# Patient Record
Sex: MALE | Race: WHITE | NOT HISPANIC OR LATINO | Employment: FULL TIME | URBAN - METROPOLITAN AREA
[De-identification: names, ages, dates, MRNs, and addresses within clinical notes are randomized per-mention and may not be internally consistent; named-entity substitution may affect disease eponyms.]

---

## 2018-01-29 ENCOUNTER — OFFICE VISIT (OUTPATIENT)
Dept: NEUROLOGY | Facility: CLINIC | Age: 53
End: 2018-01-29
Payer: COMMERCIAL

## 2018-01-29 ENCOUNTER — OFFICE VISIT (OUTPATIENT)
Dept: URGENT CARE | Facility: CLINIC | Age: 53
End: 2018-01-29
Payer: COMMERCIAL

## 2018-01-29 VITALS
DIASTOLIC BLOOD PRESSURE: 82 MMHG | HEART RATE: 53 BPM | OXYGEN SATURATION: 98 % | WEIGHT: 170 LBS | BODY MASS INDEX: 23.8 KG/M2 | HEIGHT: 71 IN | SYSTOLIC BLOOD PRESSURE: 112 MMHG | TEMPERATURE: 99 F | RESPIRATION RATE: 16 BRPM

## 2018-01-29 VITALS
HEART RATE: 43 BPM | HEIGHT: 71 IN | DIASTOLIC BLOOD PRESSURE: 72 MMHG | WEIGHT: 176.56 LBS | BODY MASS INDEX: 24.72 KG/M2 | SYSTOLIC BLOOD PRESSURE: 125 MMHG

## 2018-01-29 DIAGNOSIS — G43.409 HEMIPLEGIC MIGRAINE WITHOUT STATUS MIGRAINOSUS, NOT INTRACTABLE: Primary | ICD-10-CM

## 2018-01-29 DIAGNOSIS — Z86.69 HISTORY OF MIGRAINE WITH AURA: ICD-10-CM

## 2018-01-29 DIAGNOSIS — R29.898 WEAKNESS OF BOTH LOWER EXTREMITIES: Primary | ICD-10-CM

## 2018-01-29 DIAGNOSIS — G44.89 CHRONIC MIXED HEADACHE SYNDROME: ICD-10-CM

## 2018-01-29 PROCEDURE — 99999 PR PBB SHADOW E&M-EST. PATIENT-LVL III: CPT | Mod: PBBFAC,,, | Performed by: NEUROMUSCULOSKELETAL MEDICINE & OMM

## 2018-01-29 PROCEDURE — 99205 OFFICE O/P NEW HI 60 MIN: CPT | Mod: S$GLB,,, | Performed by: NEUROMUSCULOSKELETAL MEDICINE & OMM

## 2018-01-29 PROCEDURE — 99203 OFFICE O/P NEW LOW 30 MIN: CPT | Mod: S$GLB,,, | Performed by: NURSE PRACTITIONER

## 2018-01-29 RX ORDER — IBUPROFEN 200 MG
200 TABLET ORAL EVERY 6 HOURS PRN
COMMUNITY

## 2018-01-29 RX ORDER — LORATADINE 10 MG/1
TABLET ORAL
COMMUNITY

## 2018-01-29 NOTE — LETTER
January 29, 2018      Regina Mercer, NP  [address] [facility] [address]           [facility]  [address] [facility] [address]  Phone: [phone]          Patient: Andrew Pineda   MR Number: [id number]   YOB: 1965   Date of Visit: 1/29/2018       Dear Regina Mercer:    Thank you for referring Andrew Pineda to me for evaluation. Attached you will find relevant portions of my assessment and plan of care.    If you have questions, please do not hesitate to call me. I look forward to following Andrew Pineda along with you.    Sincerely,    Filiberto Pearson MD    Enclosure  CC:  No Recipients    If you would like to receive this communication electronically, please contact [email or web address] or [phone] to request more information on [employer] Link access.    For providers and/or their staff who would like to refer a patient to [facility], please contact us through our one-stop-shop provider referral line, [facility] Flash, at [phone].    If you feel you have received this communication in error or would no longer like to receive these types of communications, please e-mail [email or web address]

## 2018-01-29 NOTE — PATIENT INSTRUCTIONS
General Referral to [facility]  You were referred to [facility] at our [place] Location for further evaluation.  Address to the clinic is below:  [facility]  [address]  [place], [place]    You will see Dr. Filiberto Pearson    Please return here or go to the Emergency Department for any concerns or worsening of condition.  Please follow up with your primary care doctor or specialist in the next 48-72hrs as needed.    If you  smoke, please stop smoking.  Migraines and Cluster Headaches  Migraines and cluster headaches cause intense, throbbing pain on one side of the head. With a migraine, you may have nausea and vomiting and be sensitive to light and sound. You may also have warning signs, such as flashing lights or loss of parts of your vision, before the pain starts. Migraines are three times more common in women than men. This may be due to hormonal changes during menstruation. Typical migrains may last for 4 to 72 hours untreated.  Cluster headaches recur in groups for days, weeks, or months. The pain is centered around or behind one eye. The eye may also become red or teary, or the eyelid may droop. Migraines and cluster headaches can have many triggers.    Preventing migraines and cluster headaches  Try the following steps:  · Avoid aged cheeses, nuts, beans, chocolate, red wine, or foods that contain caffeine, alcohol, tobacco, nitrates, and MSG.  · Try not to skip meals.  · Dont work in poor lighting.  · Reduce stress as much as you can.  · Get plenty of sleep each night.  · Exercise regularly under your doctors guidance.  · Avoid taking headache medicines for more than 3 days, because of the risk of rebound headaches.  Relieving the pain  Try these suggestions:  · Stay quiet and rest.  · Use cold to numb the pain. Wrap ice or a cold can of soda in a cloth. Hold it against the site of pain for 10 minutes. Repeat every 20 minutes.  · Avoid light. Wear dark glasses, turn  out lights, and close the curtains. When outdoors, wear a brimmed hat.  · Drink lots of fluids. Sip caffeine-free flat soda to help relieve nausea.  · See your doctor if you get migraines or cluster headaches often. There are effective medications to help treat or prevent them.  · Hormone therapy. This may help women whose migraines are related to hormonal changes during menstruation.  Date Last Reviewed: 10/19/2015  © 0533-2344 [employer]. [address] [place] [address], [place] [address]. All rights reserved. This information is not intended as a substitute for professional medical care. Always follow your healthcare professional's instructions.        Preventing Migraine Headaches: Triggers     Red wine is a common migraine trigger.     The first step in preventing migraines is to learn what triggers them. You may then be able to control your triggers to avoid or reduce the severity of your migraines.  Know your triggers  Be aware that you may have more than one trigger, and that some triggers may work together. Common migraine triggers include:  · Food and nutrition. Skipping meals or not drinking enough water can trigger headaches. So can certain foods, such as caffeine, monosodium glutamate (MSG), aged cheese, or sausage.  · Alcohol. Red wine and other alcoholic beverages are common migraine triggers.  · Chemicals. Scents, cleaning products, gasoline, glue, perfume, and paint can be triggers. So can tobacco smoke, including secondhand smoke.  · Emotions. Stress can trigger headaches or make them worse once they begin.  · Sleep disruption. Staying up late, sleeping late, and traveling across time zones can disrupt your sleep cycle, triggering headaches.  · Hormones. Many women notice that migraines tend to happen at a certain point in their menstrual cycle. Birth control pills or hormone replacement therapy may also trigger migraines.  · Environment and weather. Air travel, changes in altitude, air  pressure changes, hot sun, or bright or flashing lights can be triggers.  Control your triggers  These are some of the things you can do to try to control triggers:  · Avoid triggers if you can. For example, stay clear of alcohol and foods that trigger your headaches. Use unscented household products. Keep regular sleep habits. Manage stress to help control emotional triggers.  · Change your behavior at times when triggers can't be avoided. For example, make sure to get enough rest and drink plenty of water while you're traveling. Make sure to carry a hat, sunglasses, and your medicines. Be alert for migraine symptoms, so you can treat a migraine early if it happens.  Date Last Reviewed: 10/9/2015  © 4857-8188 [employer]. [address] [place] [address], [place] [address]. All rights reserved. This information is not intended as a substitute for professional medical care. Always follow your healthcare professional's instructions.

## 2018-01-29 NOTE — PROGRESS NOTES
Andrew HooperRUST  1965  Review of patient's allergies indicates:  No Known Allergies  [unfilled]    No past medical history on file.  Social History     Social History    Marital status:      Spouse name: N/A    Number of children: N/A    Years of education: N/A     Occupational History    Not on file.     Social History Main Topics    Smoking status: Never Smoker    Smokeless tobacco: Never Used    Alcohol use Yes    Drug use: No    Sexual activity: Not on file     Other Topics Concern    Not on file     Social History Narrative    No narrative on file     Family History   Problem Relation Age of Onset    Hypertension Mother     Stroke Father        Review of systems:  Constitutional-negative  Eyes-negative  ENT, mouth-negative  Cardiovascular-negative  Respiratory-negative  GI-negative  - negative  Musculoskeletal-negative  Skin-negative  Neurologic-negative  Psychiatric-negative  Endocrine-negative  Hematology/lymph nodes-negative  Allergies/immunology-negative  Andrew RufusRUST  1965  Review of patient's allergies indicates:  No Known Allergies  [unfilled]    No past medical history on file.  Social History     Social History    Marital status:      Spouse name: N/A    Number of children: N/A    Years of education: N/A     Occupational History    Not on file.     Social History Main Topics    Smoking status: Never Smoker    Smokeless tobacco: Never Used    Alcohol use Yes    Drug use: No    Sexual activity: Not on file     Other Topics Concern    Not on file     Social History Narrative    No narrative on file     Family History   Problem Relation Age of Onset    Hypertension Mother     Stroke Father        Review of systems:  Constitutional-negative  Eyes-negative  ENT, mouth-negative  Cardiovascular-negative  Respiratory-negative  GI-negative  -  negative  Musculoskeletal-negative  Skin-negative  Neurologic-negative  Psychiatric-negative  Endocrine-negative  Hematology/lymph nodes-negative  Allergies/immunology-negative  Gen. Appearance: Well-developed with no obvious deformities  Carotid arteries symmetrical pulses  Peripheral vascular shows symmetrical pulses with no obvious edema or tenderness  Social History : Patient was scheduled today as an urgent neurologic evaluation.  Patient is visiting on business/vacation from [place].  He works as a  for the insurance industry.  He is accompanied by his wife.  Present history:   This is a 52-year-old white male who presents with a history of severe headache starting at 8:30 this morning with  an aura of jagged blurred vision in the left side of vision lasting about one half hour.  the aura was fairly typical of his previous migraines which he has had in the past.: upper extremities :1-2+ symmetrical ;     lower extremities KJ- 1-2 +; AJ - 1-2+ Both plantar responses were flexor  Cerebellar examination upper: Normal finger to nose and rapid alternating movements  Gait: Steady with no ataxia;      heel and toe walk normal  Romberg test: negative       Tandem gait: Normal    Involuntary movements: Negative  TMJ - no tendernesshe has had for over 20 years.  Headache intensity was 4-5/10 described as an aching pain on the top of the head associated this time with nausea and photophobia.  Patient notices that his vision completely when out on the left side during this headache which was unusual.  He typically takes 6  Advil one time for the headache which will typically last one-2 days.  He has never used triptan drugs for the migraines.  This episode was different in that he had difficulty getting up from the toilet with weakness in the left side of the arm and leg.  His wife has to help him to the bed from the toilet.  He then slept for one-2 hours with a feeling of unsteadiness when he got up.  Patient  has had significant diarrhea which was probably viral in that his wife had the same symptoms prior to this onset..  He denies any medical problems other than sinus arrhythmia.  He is quite active physically in place on hockey as well as other sports.  He has never had the weakness on the left side associated with the headache.  This headache an aura other than the weakness was typical of his previous migraines.  He has not had any fever or chills.  Neurological Exam: Patient does not appear sick.  Mental status-alert and oriented to person, place, and time; attention span and concentration is good. Fund of knowledge-patient is aware of current events and able to give detailed history of the current problem.recent and remote memory seems intact. Language function is normal with no evidence of aphasia  Cranial nerves:Visual acuity to hand chart -normal; visual fields to confrontation normal;pupils were equal and reactive to light ;no evidence of ptosis ;  funduscopic examination was normal with sharp disc margins. external ocular movements were full with no nystagmus. Facial sensation to pinprick : normal ; corneal reflexes intact; Facial muscles were symmetrical. Hearing is unimpaired symmetrical finger rub; Tongue movements - normal ; palate movements - normal ;Swallowing unimpaired. Shoulder shrug was intact with good strength Speech was normal  Motor examination: Upper : normal                                      Lower extremities - Normal;muscle tone was normal ;                  Right-handed  Sensory examination:   Upper; normal pinprick and soft touch ;   Lower extremities - normal and symmetrical.   Vibration sense: 15-20 seconds @ toes  Deep tendon reflex  Cervical examination: Full range of motion with no pain Cervical tenderness :negative  Lumbar examination: Low back tenderness-negative                  Sciatic notchtenderness-negative            Straight leg raising : negative    Impression: Hemiplegic  migraine; history migraine headaches; rule out cerebrovascular disease    Recommendations/Plan : MRI brain with and without contrast; long discussion with the patient and his wife in terms of hemiplegic migraine.  The fact that the weakness has resolved with the headache is less likely to be cerebrovascular disease.  Patient is scheduled to leave [facility] and travel toward [facility] on Wednesday.  He has agreed to get an MRI of the brain tomorrow to rule out any other intracerebral pathology.  We will contact him tomorrow afternoon with the results of the MRI.  He has my phone number to call if anything else changes.  I have emphasized the importance of following up with neurologist when he gets back home to [place].

## 2018-01-29 NOTE — PROGRESS NOTES
"Subjective:       Patient ID: Andrew Pineda is a 52 y.o. male.    Vitals:  height is 5' 11" (1.803 m) and weight is 77.1 kg (170 lb). His temperature is 98.5 °F (36.9 °C). His blood pressure is 112/82 and his pulse is 53 (abnormal). His respiration is 16 and oxygen saturation is 98%.     Chief Complaint: Extremity Weakness    Pt had a migraine about 8:30 this morning and then started feeling left sided weakness and decreased motor skills. Pt also started having diarrhea this morning at 8:30. Pt appetite has been okay. Pt took some ibuprofen. Pt not feeling a constant headache, but does feel random pains at times. Denies hx of this happening. Has hx of migraines. Pt from [place]. Here for business, works at a [employer]. Pt denies any trauma.       Extremity Weakness    This is a new problem. The current episode started today. The problem has been unchanged. The pain is at a severity of 3/10. Pertinent negatives include no fever or numbness. He has tried NSAIDS for the symptoms.     Review of Systems   Constitution: Positive for weakness and malaise/fatigue. Negative for chills and fever.   HENT: Negative for congestion and tinnitus.    Eyes: Negative for blurred vision and photophobia.   Skin: Negative for rash.   Musculoskeletal: Positive for extremity weakness. Negative for neck pain.   Gastrointestinal: Positive for diarrhea. Negative for nausea and vomiting.   Neurological: Positive for headaches. Negative for disturbances in coordination, dizziness, numbness and seizures.   Psychiatric/Behavioral: Negative for altered mental status. The patient is not nervous/anxious.        Objective:      Physical Exam   Constitutional: He is oriented to person, place, and time. He appears well-developed and well-nourished. He is cooperative.  Non-toxic appearance. He does not appear ill. No distress.   HENT:   Head: Normocephalic and atraumatic.   Right Ear: Hearing, tympanic membrane, external ear and ear canal " normal.   Left Ear: Hearing, tympanic membrane, external ear and ear canal normal.   Nose: Nose normal. No mucosal edema, rhinorrhea or nasal deformity. No epistaxis. Right sinus exhibits no maxillary sinus tenderness and no frontal sinus tenderness. Left sinus exhibits no maxillary sinus tenderness and no frontal sinus tenderness.   Mouth/Throat: Uvula is midline, oropharynx is clear and moist and mucous membranes are normal. No trismus in the jaw. Normal dentition. No uvula swelling. No posterior oropharyngeal erythema.   Eyes: Conjunctivae, EOM and lids are normal. Pupils are equal, round, and reactive to light. Right eye exhibits no discharge. Left eye exhibits no discharge. No scleral icterus.   Sclera clear bilat   Neck: Trachea normal, normal range of motion, full passive range of motion without pain and phonation normal. Neck supple.   Cardiovascular: Normal rate, regular rhythm, normal heart sounds, intact distal pulses and normal pulses.    Pulmonary/Chest: Effort normal and breath sounds normal. No respiratory distress.   Abdominal: Soft. Normal appearance and bowel sounds are normal. He exhibits no distension, no pulsatile midline mass and no mass. There is no tenderness.   Musculoskeletal: Normal range of motion. He exhibits no edema or deformity.   Neurological: He is alert and oriented to person, place, and time. He exhibits normal muscle tone. Coordination normal.   CN 2-12 were intact.  Good finger to nose task ability.  Jaci intact.  Negative Pronator drift.  Normal Gait.   Skin: Skin is warm, dry and intact. He is not diaphoretic. No pallor.   Psychiatric: He has a normal mood and affect. His speech is normal and behavior is normal. Judgment and thought content normal. Cognition and memory are normal.   Nursing note and vitals reviewed.      Assessment:       1. Weakness of both lower extremities    2. History of migraine with aura        Plan:         Weakness of both lower extremities  -      Ambulatory referral to Neurology    History of migraine with aura      Patient Instructions       General Referral to [facility]  You were referred to [facility] at our [place] Location for further evaluation.  Address to the clinic is below:  [facility]  [address]  [place], [place]    You will see Dr. Filiberto Pearson    Please return here or go to the Emergency Department for any concerns or worsening of condition.  Please follow up with your primary care doctor or specialist in the next 48-72hrs as needed.    If you  smoke, please stop smoking.  Migraines and Cluster Headaches  Migraines and cluster headaches cause intense, throbbing pain on one side of the head. With a migraine, you may have nausea and vomiting and be sensitive to light and sound. You may also have warning signs, such as flashing lights or loss of parts of your vision, before the pain starts. Migraines are three times more common in women than men. This may be due to hormonal changes during menstruation. Typical migrains may last for 4 to 72 hours untreated.  Cluster headaches recur in groups for days, weeks, or months. The pain is centered around or behind one eye. The eye may also become red or teary, or the eyelid may droop. Migraines and cluster headaches can have many triggers.    Preventing migraines and cluster headaches  Try the following steps:  · Avoid aged cheeses, nuts, beans, chocolate, red wine, or foods that contain caffeine, alcohol, tobacco, nitrates, and MSG.  · Try not to skip meals.  · Dont work in poor lighting.  · Reduce stress as much as you can.  · Get plenty of sleep each night.  · Exercise regularly under your doctors guidance.  · Avoid taking headache medicines for more than 3 days, because of the risk of rebound headaches.  Relieving the pain  Try these suggestions:  · Stay quiet and rest.  · Use cold to numb the pain. Wrap ice or a cold can of soda in a cloth. Hold it against the  site of pain for 10 minutes. Repeat every 20 minutes.  · Avoid light. Wear dark glasses, turn out lights, and close the curtains. When outdoors, wear a brimmed hat.  · Drink lots of fluids. Sip caffeine-free flat soda to help relieve nausea.  · See your doctor if you get migraines or cluster headaches often. There are effective medications to help treat or prevent them.  · Hormone therapy. This may help women whose migraines are related to hormonal changes during menstruation.  Date Last Reviewed: 10/19/2015  © 2175-2615 [employer]. [address] [place] [address]. All rights reserved. This information is not intended as a substitute for professional medical care. Always follow your healthcare professional's instructions.        Preventing Migraine Headaches: Triggers     Red wine is a common migraine trigger.     The first step in preventing migraines is to learn what triggers them. You may then be able to control your triggers to avoid or reduce the severity of your migraines.  Know your triggers  Be aware that you may have more than one trigger, and that some triggers may work together. Common migraine triggers include:  · Food and nutrition. Skipping meals or not drinking enough water can trigger headaches. So can certain foods, such as caffeine, monosodium glutamate (MSG), aged cheese, or sausage.  · Alcohol. Red wine and other alcoholic beverages are common migraine triggers.  · Chemicals. Scents, cleaning products, gasoline, glue, perfume, and paint can be triggers. So can tobacco smoke, including secondhand smoke.  · Emotions. Stress can trigger headaches or make them worse once they begin.  · Sleep disruption. Staying up late, sleeping late, and traveling across time zones can disrupt your sleep cycle, triggering headaches.  · Hormones. Many women notice that migraines tend to happen at a certain point in their menstrual cycle. Birth control pills or hormone replacement therapy may  also trigger migraines.  · Environment and weather. Air travel, changes in altitude, air pressure changes, hot sun, or bright or flashing lights can be triggers.  Control your triggers  These are some of the things you can do to try to control triggers:  · Avoid triggers if you can. For example, stay clear of alcohol and foods that trigger your headaches. Use unscented household products. Keep regular sleep habits. Manage stress to help control emotional triggers.  · Change your behavior at times when triggers can't be avoided. For example, make sure to get enough rest and drink plenty of water while you're traveling. Make sure to carry a hat, sunglasses, and your medicines. Be alert for migraine symptoms, so you can treat a migraine early if it happens.  Date Last Reviewed: 10/9/2015  © 3005-2583 [employer], [employer]. [address] [place] [address], [place] [address]. All rights reserved. This information is not intended as a substitute for professional medical care. Always follow your healthcare professional's instructions.

## 2018-01-30 ENCOUNTER — PATIENT MESSAGE (OUTPATIENT)
Dept: NEUROLOGY | Facility: CLINIC | Age: 53
End: 2018-01-30

## 2018-01-30 ENCOUNTER — HOSPITAL ENCOUNTER (OUTPATIENT)
Dept: RADIOLOGY | Facility: HOSPITAL | Age: 53
Discharge: HOME | End: 2018-01-30
Attending: NEUROMUSCULOSKELETAL MEDICINE & OMM
Payer: COMMERCIAL

## 2018-01-30 DIAGNOSIS — G44.89 CHRONIC MIXED HEADACHE SYNDROME: ICD-10-CM

## 2018-01-30 PROCEDURE — 70553 MRI BRAIN STEM W/O & W/DYE: CPT | Mod: 26,,, | Performed by: RADIOLOGY

## 2018-01-30 PROCEDURE — A9585 GADOBUTROL INJECTION: HCPCS | Performed by: NEUROMUSCULOSKELETAL MEDICINE & OMM

## 2018-01-30 PROCEDURE — 25500020 PHARM REV CODE 255: Performed by: NEUROMUSCULOSKELETAL MEDICINE & OMM

## 2018-01-30 PROCEDURE — 70553 MRI BRAIN STEM W/O & W/DYE: CPT | Mod: TC

## 2018-01-30 RX ORDER — GADOBUTROL 604.72 MG/ML
9 INJECTION INTRAVENOUS
Status: COMPLETED | OUTPATIENT
Start: 2018-01-30 | End: 2018-01-30

## 2018-01-30 RX ADMIN — GADOBUTROL 9 ML: 604.72 INJECTION INTRAVENOUS at 06:01

## 2018-01-30 NOTE — TELEPHONE ENCOUNTER
Trying to contact you by telephone to discuss MRI results.  Please call us back.  MRI scan was not normal and shows a small stroke.  We need to discuss options and need for further testing either here or when you get back to [place].

## 2018-01-31 ENCOUNTER — TELEPHONE (OUTPATIENT)
Dept: NEUROLOGY | Facility: CLINIC | Age: 53
End: 2018-01-31

## 2018-02-01 ENCOUNTER — HOSPITAL ENCOUNTER (OUTPATIENT)
Facility: HOSPITAL | Age: 53
Discharge: HOME | End: 2018-02-02
Attending: EMERGENCY MEDICINE | Admitting: PSYCHIATRY & NEUROLOGY
Payer: COMMERCIAL

## 2018-02-01 ENCOUNTER — OFFICE VISIT (OUTPATIENT)
Dept: NEUROLOGY | Facility: CLINIC | Age: 53
End: 2018-02-01
Payer: COMMERCIAL

## 2018-02-01 VITALS
DIASTOLIC BLOOD PRESSURE: 75 MMHG | HEIGHT: 71 IN | WEIGHT: 176.56 LBS | BODY MASS INDEX: 24.72 KG/M2 | SYSTOLIC BLOOD PRESSURE: 124 MMHG | HEART RATE: 51 BPM

## 2018-02-01 DIAGNOSIS — I63.9 ACUTE ISCHEMIC STROKE: ICD-10-CM

## 2018-02-01 DIAGNOSIS — R53.1 RIGHT SIDED WEAKNESS: Primary | ICD-10-CM

## 2018-02-01 DIAGNOSIS — G45.1 TIA INVOLVING CAROTID ARTERY: ICD-10-CM

## 2018-02-01 DIAGNOSIS — Q21.12 PFO (PATENT FORAMEN OVALE): ICD-10-CM

## 2018-02-01 DIAGNOSIS — Z86.73 HISTORY OF STROKE: ICD-10-CM

## 2018-02-01 PROBLEM — I63.311 THROMBOTIC STROKE INVOLVING RIGHT MIDDLE CEREBRAL ARTERY: Status: ACTIVE | Noted: 2018-02-01

## 2018-02-01 LAB
BILIRUB UR QL STRIP: NEGATIVE
CLARITY UR REFRACT.AUTO: CLEAR
COLOR UR AUTO: NORMAL
GLUCOSE UR QL STRIP: NEGATIVE
HGB UR QL STRIP: NEGATIVE
KETONES UR QL STRIP: NEGATIVE
LEUKOCYTE ESTERASE UR QL STRIP: NEGATIVE
NITRITE UR QL STRIP: NEGATIVE
PH UR STRIP: 6 [PH] (ref 5–8)
PROT UR QL STRIP: NEGATIVE
SP GR UR STRIP: 1.01 (ref 1–1.03)
TSH SERPL DL<=0.005 MIU/L-ACNC: 1.29 UIU/ML
URN SPEC COLLECT METH UR: NORMAL
UROBILINOGEN UR STRIP-ACNC: NEGATIVE EU/DL

## 2018-02-01 PROCEDURE — 93005 ELECTROCARDIOGRAM TRACING: CPT

## 2018-02-01 PROCEDURE — 93010 ELECTROCARDIOGRAM REPORT: CPT | Mod: ,,, | Performed by: INTERNAL MEDICINE

## 2018-02-01 PROCEDURE — G0378 HOSPITAL OBSERVATION PER HR: HCPCS

## 2018-02-01 PROCEDURE — 99285 EMERGENCY DEPT VISIT HI MDM: CPT | Mod: ,,, | Performed by: PHYSICIAN ASSISTANT

## 2018-02-01 PROCEDURE — 99215 OFFICE O/P EST HI 40 MIN: CPT | Mod: S$GLB,,, | Performed by: PSYCHIATRY & NEUROLOGY

## 2018-02-01 PROCEDURE — 99220 PR INITIAL OBSERVATION CARE,LEVL III: CPT | Mod: ,,, | Performed by: PSYCHIATRY & NEUROLOGY

## 2018-02-01 PROCEDURE — 99285 EMERGENCY DEPT VISIT HI MDM: CPT | Mod: 25

## 2018-02-01 PROCEDURE — 99999 PR PBB SHADOW E&M-EST. PATIENT-LVL III: CPT | Mod: PBBFAC,,, | Performed by: PSYCHIATRY & NEUROLOGY

## 2018-02-01 PROCEDURE — 81003 URINALYSIS AUTO W/O SCOPE: CPT

## 2018-02-01 PROCEDURE — 3008F BODY MASS INDEX DOCD: CPT | Mod: S$GLB,,, | Performed by: PSYCHIATRY & NEUROLOGY

## 2018-02-01 PROCEDURE — 25000003 PHARM REV CODE 250: Performed by: PSYCHIATRY & NEUROLOGY

## 2018-02-01 PROCEDURE — 84443 ASSAY THYROID STIM HORMONE: CPT

## 2018-02-01 RX ORDER — LABETALOL HYDROCHLORIDE 5 MG/ML
10 INJECTION, SOLUTION INTRAVENOUS
Status: DISCONTINUED | OUTPATIENT
Start: 2018-02-01 | End: 2018-02-02 | Stop reason: HOSPADM

## 2018-02-01 RX ORDER — SODIUM CHLORIDE 0.9 % (FLUSH) 0.9 %
3 SYRINGE (ML) INJECTION EVERY 8 HOURS
Status: DISCONTINUED | OUTPATIENT
Start: 2018-02-01 | End: 2018-02-02 | Stop reason: HOSPADM

## 2018-02-01 RX ORDER — SODIUM CHLORIDE 0.9 % (FLUSH) 0.9 %
3 SYRINGE (ML) INJECTION EVERY 8 HOURS
Status: DISCONTINUED | OUTPATIENT
Start: 2018-02-01 | End: 2018-02-01

## 2018-02-01 RX ORDER — MIDAZOLAM HYDROCHLORIDE 1 MG/ML
2 INJECTION INTRAMUSCULAR; INTRAVENOUS ONCE
Status: DISCONTINUED | OUTPATIENT
Start: 2018-02-01 | End: 2018-02-02 | Stop reason: HOSPADM

## 2018-02-01 RX ORDER — SODIUM CHLORIDE 9 MG/ML
INJECTION, SOLUTION INTRAVENOUS CONTINUOUS
Status: DISCONTINUED | OUTPATIENT
Start: 2018-02-01 | End: 2018-02-02 | Stop reason: HOSPADM

## 2018-02-01 RX ORDER — ATORVASTATIN CALCIUM 20 MG/1
40 TABLET, FILM COATED ORAL DAILY
Status: DISCONTINUED | OUTPATIENT
Start: 2018-02-01 | End: 2018-02-02 | Stop reason: HOSPADM

## 2018-02-01 RX ORDER — ASPIRIN 81 MG/1
81 TABLET ORAL DAILY
Status: DISCONTINUED | OUTPATIENT
Start: 2018-02-01 | End: 2018-02-01

## 2018-02-01 RX ORDER — NAPROXEN SODIUM 220 MG/1
81 TABLET, FILM COATED ORAL DAILY
Status: DISCONTINUED | OUTPATIENT
Start: 2018-02-01 | End: 2018-02-01

## 2018-02-01 RX ORDER — NAPROXEN SODIUM 220 MG/1
81 TABLET, FILM COATED ORAL DAILY
COMMUNITY

## 2018-02-01 RX ORDER — CETIRIZINE HYDROCHLORIDE 5 MG/1
5 TABLET ORAL DAILY
Status: DISCONTINUED | OUTPATIENT
Start: 2018-02-01 | End: 2018-02-02 | Stop reason: HOSPADM

## 2018-02-01 RX ORDER — ASPIRIN 325 MG
325 TABLET, DELAYED RELEASE (ENTERIC COATED) ORAL DAILY
Status: DISCONTINUED | OUTPATIENT
Start: 2018-02-01 | End: 2018-02-02 | Stop reason: HOSPADM

## 2018-02-01 RX ADMIN — SODIUM CHLORIDE: 0.9 INJECTION, SOLUTION INTRAVENOUS at 04:02

## 2018-02-01 RX ADMIN — ATORVASTATIN CALCIUM 40 MG: 20 TABLET, FILM COATED ORAL at 04:02

## 2018-02-01 RX ADMIN — REGULAR STRENGTH 325 MG: 325 TABLET ORAL at 04:02

## 2018-02-01 NOTE — PLAN OF CARE
Problem: Patient Care Overview  Goal: Plan of Care Review  Outcome: Ongoing (interventions implemented as appropriate)  Patient AAOx4. Safety maintained. Bed locked in low with 2 side rails up. Call light within reach. Patient at baseline neurologically.

## 2018-02-01 NOTE — LETTER
February 1, 2018      Filiberto Pearson MD  [address]  [place] [address]           [facility]  [address] [facility] [address]  Phone: [phone]          Patient: Andrew Pineda   MR Number: [id number]   YOB: 1965   Date of Visit: 2/1/2018       Dear Dr. Filiberto Pearson:    Thank you for referring Andrew Pineda to me for evaluation. Attached you will find relevant portions of my assessment and plan of care.    If you have questions, please do not hesitate to call me. I look forward to following Andrew Pineda along with you.    Sincerely,    Tom Dawn MD    Enclosure  CC:  No Recipients    If you would like to receive this communication electronically, please contact [email or web address] or [phone] to request more information on [employer] Link access.    For providers and/or their staff who would like to refer a patient to [facility], please contact us through our one-stop-shop provider referral line, [facility] Flash, at [phone].    If you feel you have received this communication in error or would no longer like to receive these types of communications, please e-mail [email or web address]

## 2018-02-01 NOTE — ED PROVIDER NOTES
Encounter Date: 2/1/2018       History     Chief Complaint   Patient presents with    Tingling     Pt had an episode of right arm & leg tingling and generalized weakness while eating breakfast this am.  States symptoms lasted a few seconds then resolved.  Pt had a stroke on Monday with left sided deficits at that time.      Patient is a 52-year-old male with a history of migraines and seizure who presents the ED with right sided weakness.  4 days ago, patient had left sided weakness which spontaneously resolved.  He went to an urgent care and was then referred to a neurologist.  Neurology ordered an MRI which showed a right sided acute/recent infarct.  Patient followed up with neurovascular in clinic this morning.  After the clinic visit, patient went to eat breakfast. He states that he stood up from the table and after a second, he had right upper and lower extremity weakness which made him collapse to the ground.  No head trauma.  He denies any loss of consciousness.  Wife denies any seizure-like activity.  Patient denies any prodromal symptoms.  He is currently asymptomatic.  He has never had any chest pains or palpitations with these events.  He started taking baby aspirin on Tuesday, 2 days ago. He takes a multivitamin daily and denies any new medication. He states that he had seizures over 25 years ago.          Review of patient's allergies indicates:  No Known Allergies  Past Medical History:   Diagnosis Date    Migraine     Stroke     TIA involving carotid artery 2/1/2018     History reviewed. No pertinent surgical history.  Family History   Problem Relation Age of Onset    Hypertension Mother     Stroke Father      Social History   Substance Use Topics    Smoking status: Never Smoker    Smokeless tobacco: Never Used    Alcohol use Yes     Review of Systems   Constitutional: Negative for fever.   HENT: Negative for sore throat.    Respiratory: Negative for shortness of breath.    Cardiovascular:  Negative for chest pain.   Gastrointestinal: Negative for nausea.   Genitourinary: Negative for dysuria.   Musculoskeletal: Negative for back pain.   Skin: Negative for rash.   Neurological: Positive for weakness and headaches.   Hematological: Does not bruise/bleed easily.       Physical Exam     Initial Vitals [02/01/18 1129]   BP Pulse Resp Temp SpO2   (!) 143/85 (!) 55 18 98.2 °F (36.8 °C) 100 %      MAP       104.33         Physical Exam    Vitals reviewed.  Constitutional: He appears well-developed and well-nourished. He is not diaphoretic. No distress.   HENT:   Head: Normocephalic and atraumatic.   Nose: Nose normal.   Eyes: Conjunctivae and EOM are normal.   Neck: Normal range of motion.   Cardiovascular: Normal rate, regular rhythm and normal heart sounds. Exam reveals no friction rub.    No murmur heard.  Pulmonary/Chest: Breath sounds normal. No respiratory distress. He has no wheezes. He has no rales.   Abdominal: Soft. Bowel sounds are normal. He exhibits no distension. There is no tenderness.   Musculoskeletal: Normal range of motion.   Neurological: He is alert and oriented to person, place, and time. He has normal strength. No sensory deficit.   No facial asymmetry. Good finger .  No arm drift.  Full range of motion and strength throughout.  Normal finger to nose, rapid alternating hand movements, and pincer finger movements.  Negative pronator and Romberg's. Answering questions appropriately.   Skin: Skin is warm and dry. No erythema.   Psychiatric: He has a normal mood and affect. Thought content normal.         ED Course   Procedures  Labs Reviewed   TSH   URINALYSIS             Medical Decision Making:   History:   Old Medical Records: I decided to obtain old medical records.  Clinical Tests:   Lab Tests: Ordered and Reviewed  Radiological Study: Ordered  Medical Tests: Ordered and Reviewed    Imaging Results          CT Head Without Contrast (Final result)  Result time 02/01/18 14:32:26     Final result by Tone Laura MD (02/01/18 14:32:26)                 Impression:         Small right right posterior caudate infarct identified on recent MRI is not well delineated on this CT.     No new intracranial infarct, hemorrhage or mass effect.    Mild chronic microvascular ischemic change.  ______________________________________     Electronically signed by resident: TONE GRANT MD  Date:     02/01/18  Time:    11:54            As the supervising and teaching physician, I personally reviewed the images and resident's interpretation and I agree with the findings.          Electronically signed by: TONE LAURA MD  Date:     02/01/18  Time:    14:32              Narrative:    CT head without contrast    02/01/18 11:34:45    Accession# [id number]    CLINICAL INDICATION: 52 year old M with Sensation loss       TECHNIQUE: Axial CT images obtained throughout the region of the head without the use of intravenous contrast. Axial, sagittal and coronal reconstructions were performed.    COMPARISON: MRI brain 01/30/18    FINDINGS:    The ventricles are stable in size and configuration.    Previously identified acute infarct in the posterior right caudate is not appreciated by CT. no significant mass effect. No new major vascular distribution infarct or hemorrhage. Mild chronic microvascular ischemic change.    No extra-axial blood or fluid collection.    Osseous calvarium is intact.    Visualized paranasal sinuses are clear. Small volume of fluid within the mastoid air cells is unchanged.                                 APC / Resident Notes:   Patient presents the ED with right upper and lower extremity weakness that spontaneously resolved.  He had a CVA 4 days ago.    On exam, vital signs stable. RRR.  No neurological deficits appreciated on exam.    DDX includes but is not limited to TIA, CVA, complex migraines, demyelinating disorders.      Lab work from earlier today with no abnormalities. No anemia. CMP  with no significant abn. ESR and CRP WNL.  Less likely giant cell arteritis.  Lipid panel WNL.     CT head without contrast shows no acute findings.   ECG with sinus terra at 57 bpm.     Neurovascular consulted.  They recommend MRI of brain and MRA of brain and neck for further evaluation with new transient right sided weakness.  They will placed in observation for further evaluation and management.  Consult appreciated. Patient and wife are agreeable to plan.               ED Course      Clinical Impression:   The primary encounter diagnosis was Right sided weakness.    Disposition:   Disposition: Admitted  Condition: Stable                        Sol Amaya PA-C  02/01/18 7161

## 2018-02-01 NOTE — HPI
"Mr Andrew Pineda is a pleasant 51 y/o with a past medical history that is significant for acute infarction involving right posterior body of the caudate and posterior thalamus a week ago without residual deficits, and transient dizziness with right sided paralysis that lasted for few seconds this morning. His wife is at the bedside.  Patient came to see my colleague Dr Dawn for stroke follow up this morning and when he left the office went to a restaurant with his wife, " everything was normal, joking and having a good conversation with my wife but when I got up from the hair experienced a dizzy sensation and paralysis of my right arm and leg". He and his wife concur that the paralysis lasted only few seconds, and they report no face droopiness, slurred speech, focal numbness, confusion, language or vision impairment.  Furthermore, denies diaphoresis, chest pain, or palpitations. Stated that he was fully aware of his environment during the entirety of the short lasting episode.  Presently, asymptomatic.  CT head was independently reviewed and showed no acute abnormality.  Has been on aspirin since this past Tuesday.     "

## 2018-02-01 NOTE — CONSULTS
[facility]  Vascular Neurology  Comprehensive Stroke Center  Consult Note    Consults  Assessment/Plan:     Patient is a 52 y.o. year old male with:    TIA involving carotid artery    53 y/o with small acute infarction posterior body of the caudate and posterior thalamus a week ago, presents after sustaining a very short lived episode of dizziness with right sided paralysis. He was seen this morning in the vascular neurology for stroke follow up.  Patient with NIHSS 0 at this time.  Possible TIA left brain.  Recommended admission to the hospital in order to get repeat MRI as his symptoms seem to be in a different territory. Likewise, obtain MRA neck and brain, TTE-bubble, lipid profile.  ASA, atorvastatin.          Antithrombotics for secondary stroke prevention: Antiplatelets: Aspirin: 325 mg daily    Statins for secondary stroke prevention and hyperlipidemia, if present:   Statins: Atorvastatin- 40 mg daily    Aggressive risk factor modification: Exercise, None     Rehab efforts: None: Reason: No deficits    Diagnostics ordered/pending: HgbA1C to assess blood glucose levels, Lipid Profile to assess cholesterol levels, MRA head to assess vasculature, MRA neck/arch to assess vasculature, MRI head without contrast to assess brain parenchyma, TTE to assess cardiac function/status , TSH to assess thyroid function    VTE prophylaxis: Heparin 5000 units SQ every 8 hours    BP parameters: TIA: SBP <220 until imaging confirmation of no infarct                 STROKE DOCUMENTATION          NIH Scale:  Interval: baseline (upon arrival/admit)  1a. Level Of Consciousness: 0-->Alert: keenly responsive  1b. LOC Questions: 0-->Answers both questions correctly  1c. LOC Commands: 0-->Performs both tasks correctly  2. Best Gaze: 0-->Normal  3. Visual: 0-->No visual loss  4. Facial Palsy: 0-->Normal symmetrical movements  5a. Motor Arm, Left: 0-->No drift: limb holds 90 (or 45) degrees for full 10 secs  5b. Motor  "Arm, Right: 0-->No drift: limb holds 90 (or 45) degrees for full 10 secs  6a. Motor Leg, Left: 0-->No drift: leg holds 30 degree position for full 5 secs  6b. Motor Leg, Right: 0-->No drift: leg holds 30 degree position for full 5 secs  7. Limb Ataxia: 0-->Absent  8. Sensory: 0-->Normal: no sensory loss  9. Best Language: 0-->No aphasia: normal  10. Dysarthria: 0-->Normal  11. Extinction and Inattention (formerly Neglect): 0-->No abnormality  Total (NIH Stroke Scale): 0    Modified Donal Score: 0  Fabien Coma Scale:15   ABCD2 Score: 2  [id number] Score:   HAS -BLED Score:   ICH Score:   Hunt & Chavez Classification:       Thrombolysis Candidate? No, symptoms resolved      Interventional Revascularization Candidate?   Is the patient eligible for mechanical endovascular reperfusion ([place])?  No; No significant neurological deficit      Hemorrhagic change of an Ischemic Stroke: Does this patient have an ischemic stroke with hemorrhagic changes? No     Subjective:     History of Present Illness:  Mr Andrew Pineda is a pleasant 51 y/o with a past medical history that is significant for acute infarction involving right posterior body of the caudate and posterior thalamus a week ago without residual deficits, and transient dizziness with right sided paralysis that lasted for few seconds this morning. His wife is at the bedside.  Patient came to see my colleague Dr Dawn for stroke follow up this morning and when he left the office went to a restaurant with his wife, " everything was normal, joking and having a good conversation with my wife but when I got up from the hair experienced a dizzy sensation and paralysis of my right arm and leg". He and his wife concur that the paralysis lasted only few seconds, and they report no face droopiness, slurred speech, focal numbness, confusion, language or vision impairment.  Furthermore, denies diaphoresis, chest pain, or palpitations. Stated that he was fully aware of his " environment during the entirety of the short lasting episode.  Presently, asymptomatic.  CT head was independently reviewed and showed no acute abnormality.  Has been on aspirin since this past Tuesday.           Past Medical History:   Diagnosis Date    Migraine     Stroke      History reviewed. No pertinent surgical history.  Family History   Problem Relation Age of Onset    Hypertension Mother     Stroke Father      Social History   Substance Use Topics    Smoking status: Never Smoker    Smokeless tobacco: Never Used    Alcohol use Yes     Review of patient's allergies indicates:  No Known Allergies    Medications: I have reviewed the current medication administration record.      (Not in a hospital admission)    Review of Systems   Constitutional: Negative for chills, diaphoresis and fever.   HENT: Negative for hearing loss, tinnitus and trouble swallowing.    Eyes: Negative for photophobia, pain and visual disturbance.   Respiratory: Negative for choking, chest tightness and shortness of breath.    Cardiovascular: Negative for chest pain and palpitations.   Gastrointestinal: Negative for abdominal pain, blood in stool and vomiting.   Endocrine: Negative for cold intolerance and polyuria.   Genitourinary: Negative for dysuria and hematuria.   Musculoskeletal: Negative for gait problem, neck pain and neck stiffness.   Skin: Negative for pallor and rash.   Neurological: Positive for dizziness, weakness and headaches. Negative for seizures, syncope, speech difficulty and numbness.   Hematological: Does not bruise/bleed easily.   Psychiatric/Behavioral: Negative for agitation, behavioral problems and confusion.     Objective:     Vital Signs (Most Recent):  Temp: 98.2 °F (36.8 °C) (02/01/18 1402)  Pulse: (!) 56 (02/01/18 1402)  Resp: 18 (02/01/18 1402)  BP: 128/75 (02/01/18 1402)  SpO2: 97 % (02/01/18 1402)    Vital Signs Range (Last 24H):  Temp:  [98.2 °F (36.8 °C)]   Pulse:  [51-56]   Resp:  [18]   BP:  (124-143)/(75-85)   SpO2:  [97 %-100 %]     Physical Exam   Constitutional: He is oriented to person, place, and time. He appears well-developed and well-nourished.   HENT:   Head: Normocephalic and atraumatic.   Eyes: EOM are normal. Pupils are equal, round, and reactive to light.   Neck: Normal range of motion. Neck supple.   Cardiovascular: Normal rate and regular rhythm.    Pulmonary/Chest: Effort normal. No respiratory distress.   Abdominal: Soft. He exhibits no mass.   Genitourinary:   Genitourinary Comments: No performed   Musculoskeletal: Normal range of motion. He exhibits no edema or tenderness.   Neurological: He is alert and oriented to person, place, and time. He displays normal reflexes. No cranial nerve deficit or sensory deficit. He exhibits normal muscle tone. Coordination normal.   Skin: Skin is warm. No rash noted. No erythema.   Psychiatric: He has a normal mood and affect. His behavior is normal. Thought content normal.       Neurological Exam:   LOC: alert  Attention Span: Good   Language: No aphasia  Articulation: No dysarthria  Orientation: Person, Place, Time   Visual Fields: Full  EOM (CN III, IV, VI): Full/intact  Pupils (CN II, III): PERRL  Facial Sensation (CN V): Normal  Facial Movement (CN VII): Symmetric facial expression    Gag Reflex: present  Reflexes: 2+ throughout  Motor: Arm left  Normal 5/5  Leg left  Normal 5/5  Arm right  Normal 5/5  Leg right Normal 5/5  Cebellar: No evidence of appendicular or axial ataxia  Sensation: Intact to light touch, temperature and vibration  Tone: Normal tone throughout      Laboratory:  CMP:   Recent Labs  Lab 02/01/18  0918   CALCIUM 9.1   ALBUMIN 3.6   PROT 6.8      K 4.3   CO2 28      BUN 18   CREATININE 1.0   ALKPHOS 34*   ALT 21   AST 27   BILITOT 0.5     CBC:   Recent Labs  Lab 02/01/18  0918   WBC 3.82*   RBC 4.25*   HGB 13.9*   HCT 40.3      MCV 95   MCH 32.7*   MCHC 34.5     Lipid Panel:   Recent Labs  Lab  02/01/18  0918   CHOL 178   LDLCALC 95.4   HDL 70   TRIG 63     Coagulation: No results for input(s): PT, INR, APTT in the last 168 hours.  Hgb A1C:   Recent Labs  Lab 02/01/18 0918   HGBA1C 5.0     TSH: No results for input(s): TSH in the last 168 hours.    Diagnostic Results:      Brain imaging:  CT head 2/1/118: no acute abnormality.  MRI brain 1/29/18: Small region of signal abnormality right posterior body of the caudate and posterior thalamus most compatible with acute/recent infarction    Vessel Imaging:  None for review    Cardiac Evaluation:   EKG: NSR      Giuseppe Phoenix MD  Comprehensive Stroke Center  Department of Vascular Neurology   [facility]

## 2018-02-01 NOTE — ASSESSMENT & PLAN NOTE
53 y/o with small acute infarction posterior body of the caudate and posterior thalamus a week ago, presents after sustaining a very short lived episode of dizziness with right sided paralysis. He was seen this morning in the vascular neurology for stroke follow up.  Patient with NIHSS 0 at this time.  Possible TIA left brain.  Recommended admission to the hospital in order to get repeat MRI as his symptoms seem to be in a different territory. Likewise, obtain MRA neck and brain, TTE, lipid profile.  ASA, atorvastatin.          Antithrombotics for secondary stroke prevention: Antiplatelets: Aspirin: 325 mg daily    Statins for secondary stroke prevention and hyperlipidemia, if present:   Statins: Atorvastatin- 40 mg daily    Aggressive risk factor modification: Exercise, None     Rehab efforts: None: Reason: No deficits    Diagnostics ordered/pending: HgbA1C to assess blood glucose levels, Lipid Profile to assess cholesterol levels, MRA head to assess vasculature, MRA neck/arch to assess vasculature, MRI head without contrast to assess brain parenchyma, TTE to assess cardiac function/status , TSH to assess thyroid function    VTE prophylaxis: Heparin 5000 units SQ every 8 hours    BP parameters: TIA: SBP <220 until imaging confirmation of no infarct

## 2018-02-01 NOTE — CONSULTS
Inpatient consult to Physical Medicine Rehab  Consult performed by: ULISSES VARGAS  Consult ordered by: LLOYD MONTANO  Reason for consult: assess rehab needs      Reviewed patient history and current admission.  Rehab team following.  Full consult to follow.    CLARK Holbrook, FNP-C  Physical Medicine & Rehabilitation   02/01/2018  Spectralink: [id number]

## 2018-02-01 NOTE — PROGRESS NOTES
Patient arrived to the OBS #1 via wheelchair. Patient ambulated well from wheelchair to bed. Tele monitor in place. Bed locked in low with 2 side rails up. Call light within reach. N NS infusing at 75 ml/hr to LAC #18. Orthostatics BPs performed. HERNAN performed. No complaints at this time.

## 2018-02-01 NOTE — PROGRESS NOTES
"Vascular Neurology  Clinic Note    Reason For Visit (Chief Complaint): stroke while on vacation in [place]    HPI: 52 y.o. right handed male with 4 days ago having difficulty walking d/t left leg > arm weakness, fell against the wall, made his way into his hotel room. At that point he had what he thought was a significant "aura" where half the room went black on the left and never developed a headache (typical aura is fuzzy scintillations and some splotchy vision loss making it difficult to read - then takes 5-6 ibuprofen, and then tries to subdue the headache - he gets them every month or every other month - has had them for 20 years).  The aforementioned symptoms have never happened prior to the event. There are no identified triggers or modifying factors. There have been no recurrent events. There are no other associated symptoms.  His vision loss lasted about 30 min - 1 hour, the left sided weakness lasted throughout most of the day.    Patient lives in [place], planning on staying in [place] until mid-march.    Brain Imaging:  MRI Small subcortical infarct in periventricular corona radiata ; FLAIR w/ punctate white matter hyperintensities, frontal predominant    Vessel Imaging:  None  Cardiac Evaluation:  None  Other:   None  Relevant Labwork:    Recent Labs  Lab 02/01/18  0918   LDL CHOLESTEROL 95.4   HDL 70   TRIGLYCERIDES 63   CHOLESTEROL 178       I independently viewed the above imaging studies in addition to reviewing the report.  I reviewed the above labwork.    Review of Systems  Msk: negative for muscle pain  Skin: negative for pruritis  Neuro: negative for headache  All others negative    Past Medical History  Sinus arrhythmia, Migraine w/ aura  Family History  No relevant history   Social History  Never Smoker     Medication List with Changes/Refills   Current Medications    IBUPROFEN (ADVIL,MOTRIN) 200 MG TABLET    Take 200 mg by mouth every 6 (six) hours as needed for Pain.    LORATADINE " "(CLARITIN) 10 MG TABLET    Take by mouth.       EXAM  Vital Signs:Blood pressure 124/75, pulse (!) 51, height 5' 11" (1.803 m), weight 80.1 kg (176 lb 9.4 oz).  General: well appearing without discomfort   Mental Status:alert, oriented to person - place - age - month   Language: able to name, repeat, comprehend commands   Cranial Nerves: EOMI, PERRL, no facial asymmetry, tongue to midline, palate midline  Motor: 5/5 power in all extremities, normal tone  Sensory: intact light touch bilaterally, intact proprioception bilaterally  Coordination: no ataxia on finger-to-nose or heel-to-shin testing; no truncal ataxia  Gait & Stance: normal    NIH Stroke Scale:    Level of Consciousness: 0 - alert  LOC Questions: 0 - answers both correctly  LOC Commands: 0 - performs both correctly  Best Gaze: 0 - normal  Visual: 0 - no visual loss  Facial Palsy: 0 - normal  Motor Left Arm: 0 - no drift  Motor Right Arm: 0 - no drift  Motor Left Le - no drift  Motor Right Le - no drift  Limb Ataxia: 0 - absent  Sensory: 0 - normal  Best Language: 0 - no aphasia  Dysarthria: 0 - normal articulation  Extinction and Inattention: 0 - no neglect  NIH Stroke Scale Total: 0        ___________________  ASSESSMENT & PLAN      Problem List Items Addressed This Visit        Unprioritized    History of stroke    Overview     17 - R corona radiata small subcortical infarct         Current Assessment & Plan     Etiology: Small Vessel Occlusion Probable  RAIN Incomplete -- CE Incomplete --  Major: SSI R corona -- Other Absent   Event with left hemiparesis and left hemianopsia, in addition to the MRI findings, sound compelling for a full territory anterior chroidal artery infarct that markedly improved with some residual infarct seen; the history is not consistent with migrainous infarct; although imaging criteria met for small vessel infarct, paucity of known risk factors and patient's age should prompt further investigation into " alternative etiologies  · Diagnostic Orders: CTA H&N , TTE w/ bubble, ESR/CRP  · Secondary stroke prevention: Continue asa 81 mg  · Pending results of lipid profile, will determine dosage of statin therapy, if any  · Blood pressure goal < 130/80 mmHg   · Stroke Risk Factors Addressed: migraine w/ aura  · Stroke education administered             Relevant Orders    2D echo with bubble contrast    CTA Head and Neck (xpd)    Hemoglobin A1c    Lipid panel (Completed)    Comprehensive metabolic panel (Completed)    CBC auto differential (Completed)    C-REACTIVE PROTEIN (Completed)    SEDIMENTATION RATE, MANUAL          MD Traci  Vascular Neurology  Office [phone]  Fax [phone]

## 2018-02-01 NOTE — SUBJECTIVE & OBJECTIVE
Past Medical History:   Diagnosis Date    Migraine     Stroke      History reviewed. No pertinent surgical history.  Family History   Problem Relation Age of Onset    Hypertension Mother     Stroke Father      Social History   Substance Use Topics    Smoking status: Never Smoker    Smokeless tobacco: Never Used    Alcohol use Yes     Review of patient's allergies indicates:  No Known Allergies    Medications: I have reviewed the current medication administration record.      (Not in a hospital admission)    Review of Systems   Constitutional: Negative for chills, diaphoresis and fever.   HENT: Negative for hearing loss, tinnitus and trouble swallowing.    Eyes: Negative for photophobia, pain and visual disturbance.   Respiratory: Negative for choking, chest tightness and shortness of breath.    Cardiovascular: Negative for chest pain and palpitations.   Gastrointestinal: Negative for abdominal pain, blood in stool and vomiting.   Endocrine: Negative for cold intolerance and polyuria.   Genitourinary: Negative for dysuria and hematuria.   Musculoskeletal: Negative for gait problem, neck pain and neck stiffness.   Skin: Negative for pallor and rash.   Neurological: Positive for dizziness, weakness and headaches. Negative for seizures, syncope, speech difficulty and numbness.   Hematological: Does not bruise/bleed easily.   Psychiatric/Behavioral: Negative for agitation, behavioral problems and confusion.     Objective:     Vital Signs (Most Recent):  Temp: 98.2 °F (36.8 °C) (02/01/18 1402)  Pulse: (!) 56 (02/01/18 1402)  Resp: 18 (02/01/18 1402)  BP: 128/75 (02/01/18 1402)  SpO2: 97 % (02/01/18 1402)    Vital Signs Range (Last 24H):  Temp:  [98.2 °F (36.8 °C)]   Pulse:  [51-56]   Resp:  [18]   BP: (124-143)/(75-85)   SpO2:  [97 %-100 %]     Physical Exam   Constitutional: He is oriented to person, place, and time. He appears well-developed and well-nourished.   HENT:   Head: Normocephalic and atraumatic.    Eyes: EOM are normal. Pupils are equal, round, and reactive to light.   Neck: Normal range of motion. Neck supple.   Cardiovascular: Normal rate and regular rhythm.    Pulmonary/Chest: Effort normal. No respiratory distress.   Abdominal: Soft. He exhibits no mass.   Genitourinary:   Genitourinary Comments: No performed   Musculoskeletal: Normal range of motion. He exhibits no edema or tenderness.   Neurological: He is alert and oriented to person, place, and time. He displays normal reflexes. No cranial nerve deficit or sensory deficit. He exhibits normal muscle tone. Coordination normal.   Skin: Skin is warm. No rash noted. No erythema.   Psychiatric: He has a normal mood and affect. His behavior is normal. Thought content normal.       Neurological Exam:   LOC: alert  Attention Span: Good   Language: No aphasia  Articulation: No dysarthria  Orientation: Person, Place, Time   Visual Fields: Full  EOM (CN III, IV, VI): Full/intact  Pupils (CN II, III): PERRL  Facial Sensation (CN V): Normal  Facial Movement (CN VII): Symmetric facial expression    Gag Reflex: present  Reflexes: 2+ throughout  Motor: Arm left  Normal 5/5  Leg left  Normal 5/5  Arm right  Normal 5/5  Leg right Normal 5/5  Cebellar: No evidence of appendicular or axial ataxia  Sensation: Intact to light touch, temperature and vibration  Tone: Normal tone throughout      Laboratory:  CMP:   Recent Labs  Lab 02/01/18 0918   CALCIUM 9.1   ALBUMIN 3.6   PROT 6.8      K 4.3   CO2 28      BUN 18   CREATININE 1.0   ALKPHOS 34*   ALT 21   AST 27   BILITOT 0.5     CBC:   Recent Labs  Lab 02/01/18 0918   WBC 3.82*   RBC 4.25*   HGB 13.9*   HCT 40.3      MCV 95   MCH 32.7*   MCHC 34.5     Lipid Panel:   Recent Labs  Lab 02/01/18 0918   CHOL 178   LDLCALC 95.4   HDL 70   TRIG 63     Coagulation: No results for input(s): PT, INR, APTT in the last 168 hours.  Hgb A1C:   Recent Labs  Lab 02/01/18 0918   HGBA1C 5.0     TSH: No results for  input(s): TSH in the last 168 hours.    Diagnostic Results:      Brain imaging:  CT head 2/1/118: no acute abnormality.  MRI brain 1/29/18: Small region of signal abnormality right posterior body of the caudate and posterior thalamus most compatible with acute/recent infarction    Vessel Imaging:  None for review    Cardiac Evaluation:   EKG: NSR

## 2018-02-01 NOTE — ED TRIAGE NOTES
"Today stood up after eating breakfast, pt had and episode of dizziness with right arm and leg paralysis, pt reported falling to the ground, episode lasting for 30 seconds. Pt reports "feeling fairly normal." c/o HA at this time.   "

## 2018-02-01 NOTE — ED NOTES
Patient identifiers for Andrew Pineda 52 y.o. male checked and correct.  Chief Complaint   Patient presents with    Tingling     Pt had an episode of right arm & leg tingling and generalized weakness while eating breakfast this am.  States symptoms lasted a few seconds then resolved.  Pt had a stroke on Monday with left sided deficits at that time.      Past Medical History:   Diagnosis Date    Migraine     Stroke      Allergies reported: Review of patient's allergies indicates:  No Known Allergies      LOC: Patient is awake, alert, and aware of environment with an appropriate affect. Patient is oriented x 3 and speaking appropriately.  APPEARANCE: Patient resting comfortably and in no acute distress. Patient is clean and well groomed, patient's clothing is properly fastened.  HEENT: generalized HA to entire head.  SKIN: The skin is warm and dry. Patient has normal skin turgor and moist mucus membranes. Skin is intact; no bruising or breakdown noted.  MUSKULOSKELETAL: Patient is moving all extremities well, no obvious deformities noted. Pulses intact.   RESPIRATORY: Airway is open and patent. Respirations are spontaneous and non-labored with normal effort and rate.  NEUROLOGICAL: pupils 3mm, PERRL. Facial expression is symmetrical. Hand grasps are equal bilaterally. Normal sensation in all extremities when touched with finger.

## 2018-02-01 NOTE — ED NOTES
Spoke with Dr. Boyer regarding pt's symptoms.  States not a stroke code but orders received for head CT.

## 2018-02-01 NOTE — ASSESSMENT & PLAN NOTE
Etiology: Small Vessel Occlusion Probable  RAIN Incomplete -- CE Incomplete --  Major: SSI R corona -- Other Absent   Event with left hemiparesis and left hemianopsia, in addition to the MRI findings, sound compelling for a full territory anterior chroidal artery infarct that markedly improved with some residual infarct seen; the history is not consistent with migrainous infarct; although imaging criteria met for small vessel infarct, paucity of known risk factors and patient's age should prompt further investigation into alternative etiologies  · Diagnostic Orders: CTA H&N , TTE w/ bubble, ESR/CRP  · Secondary stroke prevention: Continue asa 81 mg  · Pending results of lipid profile, will determine dosage of statin therapy, if any  · Blood pressure goal < 130/80 mmHg   · Stroke Risk Factors Addressed: migraine w/ aura  · Stroke education administered

## 2018-02-02 ENCOUNTER — CLINICAL SUPPORT (OUTPATIENT)
Dept: ELECTROPHYSIOLOGY | Facility: CLINIC | Age: 53
End: 2018-02-02
Payer: COMMERCIAL

## 2018-02-02 VITALS
HEART RATE: 52 BPM | WEIGHT: 175.94 LBS | HEIGHT: 71 IN | OXYGEN SATURATION: 98 % | SYSTOLIC BLOOD PRESSURE: 117 MMHG | DIASTOLIC BLOOD PRESSURE: 74 MMHG | TEMPERATURE: 97 F | RESPIRATION RATE: 16 BRPM | BODY MASS INDEX: 24.63 KG/M2

## 2018-02-02 DIAGNOSIS — Z86.73 HISTORY OF STROKE: ICD-10-CM

## 2018-02-02 DIAGNOSIS — G45.1 TIA INVOLVING CAROTID ARTERY: Primary | ICD-10-CM

## 2018-02-02 PROBLEM — Q21.12 PFO (PATENT FORAMEN OVALE): Status: ACTIVE | Noted: 2018-02-02

## 2018-02-02 LAB
ALBUMIN SERPL BCP-MCNC: 3.4 G/DL
ALBUMIN SERPL BCP-MCNC: 3.4 G/DL
ALP SERPL-CCNC: 34 U/L
ALP SERPL-CCNC: 34 U/L
ALT SERPL W/O P-5'-P-CCNC: 21 U/L
ALT SERPL W/O P-5'-P-CCNC: 21 U/L
ANION GAP SERPL CALC-SCNC: 8 MMOL/L
ANION GAP SERPL CALC-SCNC: 8 MMOL/L
APTT BLDCRRT: 24.3 SEC
AST SERPL-CCNC: 25 U/L
AST SERPL-CCNC: 25 U/L
BASOPHILS # BLD AUTO: 0.02 K/UL
BASOPHILS # BLD AUTO: 0.02 K/UL
BASOPHILS NFR BLD: 0.4 %
BASOPHILS NFR BLD: 0.4 %
BILIRUB SERPL-MCNC: 0.8 MG/DL
BILIRUB SERPL-MCNC: 0.8 MG/DL
BUN SERPL-MCNC: 15 MG/DL
BUN SERPL-MCNC: 15 MG/DL
CALCIUM SERPL-MCNC: 8.6 MG/DL
CALCIUM SERPL-MCNC: 8.6 MG/DL
CHLORIDE SERPL-SCNC: 110 MMOL/L
CHLORIDE SERPL-SCNC: 110 MMOL/L
CK MB SERPL-MCNC: 0.8 NG/ML
CK MB SERPL-MCNC: 0.8 NG/ML
CK MB SERPL-RTO: 0.8 %
CK MB SERPL-RTO: 0.8 %
CK SERPL-CCNC: 96 U/L
CK SERPL-CCNC: 96 U/L
CO2 SERPL-SCNC: 23 MMOL/L
CO2 SERPL-SCNC: 23 MMOL/L
CREAT SERPL-MCNC: 1 MG/DL
CREAT SERPL-MCNC: 1 MG/DL
DIASTOLIC DYSFUNCTION: NO
DIFFERENTIAL METHOD: ABNORMAL
DIFFERENTIAL METHOD: ABNORMAL
EOSINOPHIL # BLD AUTO: 0.1 K/UL
EOSINOPHIL # BLD AUTO: 0.1 K/UL
EOSINOPHIL NFR BLD: 2.9 %
EOSINOPHIL NFR BLD: 2.9 %
ERYTHROCYTE [DISTWIDTH] IN BLOOD BY AUTOMATED COUNT: 12 %
ERYTHROCYTE [DISTWIDTH] IN BLOOD BY AUTOMATED COUNT: 12 %
EST. GFR  (AFRICAN AMERICAN): >60 ML/MIN/1.73 M^2
EST. GFR  (AFRICAN AMERICAN): >60 ML/MIN/1.73 M^2
EST. GFR  (NON AFRICAN AMERICAN): >60 ML/MIN/1.73 M^2
EST. GFR  (NON AFRICAN AMERICAN): >60 ML/MIN/1.73 M^2
ESTIMATED PA SYSTOLIC PRESSURE: 35.43
GLUCOSE SERPL-MCNC: 97 MG/DL
GLUCOSE SERPL-MCNC: 97 MG/DL
HCT VFR BLD AUTO: 39.3 %
HCT VFR BLD AUTO: 39.3 %
HGB BLD-MCNC: 13.7 G/DL
HGB BLD-MCNC: 13.7 G/DL
IMM GRANULOCYTES # BLD AUTO: 0.01 K/UL
IMM GRANULOCYTES # BLD AUTO: 0.01 K/UL
IMM GRANULOCYTES NFR BLD AUTO: 0.2 %
IMM GRANULOCYTES NFR BLD AUTO: 0.2 %
INR PPP: 1.1
INR PPP: 1.1
LYMPHOCYTES # BLD AUTO: 1.9 K/UL
LYMPHOCYTES # BLD AUTO: 1.9 K/UL
LYMPHOCYTES NFR BLD: 38.8 %
LYMPHOCYTES NFR BLD: 38.8 %
MAGNESIUM SERPL-MCNC: 1.9 MG/DL
MAGNESIUM SERPL-MCNC: 1.9 MG/DL
MCH RBC QN AUTO: 32.8 PG
MCH RBC QN AUTO: 32.8 PG
MCHC RBC AUTO-ENTMCNC: 34.9 G/DL
MCHC RBC AUTO-ENTMCNC: 34.9 G/DL
MCV RBC AUTO: 94 FL
MCV RBC AUTO: 94 FL
MONOCYTES # BLD AUTO: 0.4 K/UL
MONOCYTES # BLD AUTO: 0.4 K/UL
MONOCYTES NFR BLD: 8.6 %
MONOCYTES NFR BLD: 8.6 %
NEUTROPHILS # BLD AUTO: 2.4 K/UL
NEUTROPHILS # BLD AUTO: 2.4 K/UL
NEUTROPHILS NFR BLD: 49.1 %
NEUTROPHILS NFR BLD: 49.1 %
NRBC BLD-RTO: 0 /100 WBC
NRBC BLD-RTO: 0 /100 WBC
PHOSPHATE SERPL-MCNC: 3.3 MG/DL
PHOSPHATE SERPL-MCNC: 3.3 MG/DL
PLATELET # BLD AUTO: 158 K/UL
PLATELET # BLD AUTO: 158 K/UL
PMV BLD AUTO: 9.2 FL
PMV BLD AUTO: 9.2 FL
POTASSIUM SERPL-SCNC: 4 MMOL/L
POTASSIUM SERPL-SCNC: 4 MMOL/L
PROT SERPL-MCNC: 6.2 G/DL
PROT SERPL-MCNC: 6.2 G/DL
PROTHROMBIN TIME: 11 SEC
PROTHROMBIN TIME: 11 SEC
RBC # BLD AUTO: 4.18 M/UL
RBC # BLD AUTO: 4.18 M/UL
RETIRED EF AND QEF - SEE NOTES: 55 (ref 55–65)
SODIUM SERPL-SCNC: 141 MMOL/L
SODIUM SERPL-SCNC: 141 MMOL/L
TROPONIN I SERPL DL<=0.01 NG/ML-MCNC: <0.006 NG/ML
WBC # BLD AUTO: 4.79 K/UL
WBC # BLD AUTO: 4.79 K/UL

## 2018-02-02 PROCEDURE — 36415 COLL VENOUS BLD VENIPUNCTURE: CPT

## 2018-02-02 PROCEDURE — G0378 HOSPITAL OBSERVATION PER HR: HCPCS

## 2018-02-02 PROCEDURE — 82553 CREATINE MB FRACTION: CPT

## 2018-02-02 PROCEDURE — 84100 ASSAY OF PHOSPHORUS: CPT

## 2018-02-02 PROCEDURE — 92610 EVALUATE SWALLOWING FUNCTION: CPT

## 2018-02-02 PROCEDURE — 97112 NEUROMUSCULAR REEDUCATION: CPT

## 2018-02-02 PROCEDURE — 93306 TTE W/DOPPLER COMPLETE: CPT | Mod: 26,,, | Performed by: INTERNAL MEDICINE

## 2018-02-02 PROCEDURE — 93268 ECG RECORD/REVIEW: CPT | Mod: S$GLB,,, | Performed by: INTERNAL MEDICINE

## 2018-02-02 PROCEDURE — G8987 SELF CARE CURRENT STATUS: HCPCS | Mod: CH

## 2018-02-02 PROCEDURE — G8989 SELF CARE D/C STATUS: HCPCS | Mod: CH

## 2018-02-02 PROCEDURE — 83735 ASSAY OF MAGNESIUM: CPT

## 2018-02-02 PROCEDURE — 92523 SPEECH SOUND LANG COMPREHEN: CPT

## 2018-02-02 PROCEDURE — G8988 SELF CARE GOAL STATUS: HCPCS | Mod: CH

## 2018-02-02 PROCEDURE — 93306 TTE W/DOPPLER COMPLETE: CPT

## 2018-02-02 PROCEDURE — 25000003 PHARM REV CODE 250: Performed by: PSYCHIATRY & NEUROLOGY

## 2018-02-02 PROCEDURE — 80053 COMPREHEN METABOLIC PANEL: CPT

## 2018-02-02 PROCEDURE — 85730 THROMBOPLASTIN TIME PARTIAL: CPT

## 2018-02-02 PROCEDURE — 84484 ASSAY OF TROPONIN QUANT: CPT

## 2018-02-02 PROCEDURE — 99226 PR SUBSEQUENT OBSERVATION CARE,LEVEL III: CPT | Mod: ,,, | Performed by: PSYCHIATRY & NEUROLOGY

## 2018-02-02 PROCEDURE — 85610 PROTHROMBIN TIME: CPT

## 2018-02-02 PROCEDURE — 85025 COMPLETE CBC W/AUTO DIFF WBC: CPT

## 2018-02-02 PROCEDURE — 99252 IP/OBS CONSLTJ NEW/EST SF 35: CPT | Mod: ,,, | Performed by: NURSE PRACTITIONER

## 2018-02-02 PROCEDURE — 97165 OT EVAL LOW COMPLEX 30 MIN: CPT

## 2018-02-02 RX ADMIN — ATORVASTATIN CALCIUM 40 MG: 20 TABLET, FILM COATED ORAL at 08:02

## 2018-02-02 RX ADMIN — REGULAR STRENGTH 325 MG: 325 TABLET ORAL at 08:02

## 2018-02-02 NOTE — DISCHARGE SUMMARY
"[facility]  Vascular Neurology  Comprehensive Stroke Center  Discharge Summary     Summary:     Admit Date: 2/1/2018 12:16 PM    Discharge Date and Time:  02/02/2018 2:01 PM    Attending Physician: Lucius Petersen MD     Discharge Provider: Tiago Lindsey MD    History of Present Illness: Mr Andrew Pineda is a pleasant 53 y/o with a past medical history that is significant for acute infarction involving right posterior body of the caudate and posterior thalamus a week ago without residual deficits, and transient dizziness with right sided paralysis that lasted for few seconds this morning. His wife is at the bedside.  Patient came to see my colleague Dr Dawn for stroke follow up this morning and when he left the office went to a restaurant with his wife, " everything was normal, joking and having a good conversation with my wife but when I got up from the hair experienced a dizzy sensation and paralysis of my right arm and leg". He and his wife concur that the paralysis lasted only few seconds, and they report no face droopiness, slurred speech, focal numbness, confusion, language or vision impairment.  Furthermore, denies diaphoresis, chest pain, or palpitations. Stated that he was fully aware of his environment during the entirety of the short lasting episode.  Presently, asymptomatic.  CT head was independently reviewed and showed no acute abnormality.  Has been on aspirin since this past Tuesday.       Hospital Course (synopsis of major diagnoses, care, treatment, and services provided during the course of the hospital stay): Andrew Pineda presented after an episode of dizziness and right sided weakness on 02/01. He was stable on admission without any neuro deficits or signs of CVA otherwise, imaging was negative, he remained asymptomatic and was stable and medically appropriate for DC to home on 02/02. He was instructed to continue to take home ASA 81mg daily. He was found to have a " patent PFO this admission and is high risk for embolic events in the future. Patient was sent home with 30 day event monitor and instructed to f/u with cardiologist as soon as he is able.     Review of Systems   Constitutional: Negative for chills, diaphoresis and fever.   HENT: Negative for hearing loss, tinnitus and trouble swallowing.    Eyes: Negative for photophobia, pain and visual disturbance.   Respiratory: Negative for choking, chest tightness and shortness of breath.    Cardiovascular: Negative for chest pain and palpitations.   Gastrointestinal: Negative for abdominal pain, blood in stool and vomiting.   Endocrine: Negative for cold intolerance and polyuria.   Genitourinary: Negative for dysuria and hematuria.   Musculoskeletal: Negative for gait problem, neck pain and neck stiffness.   Skin: Negative for pallor and rash.   Neurological: Negative for seizures, syncope, speech difficulty and numbness.   Hematological: Does not bruise/bleed easily.   Psychiatric/Behavioral: Negative for agitation, behavioral problems and confusion.     Patient was seen and examined on day of discharge. Vital signs reviewed and exam as follows:    Physical Exam   Constitutional: He is oriented to person, place, and time. He appears well-developed and well-nourished.   HENT:   Head: Normocephalic and atraumatic.   Eyes: Conjunctivae are normal. Lids are normal.   Neck: Neck supple.   Cardiovascular: Normal rate and regular rhythm.    No murmur heard.  Pulmonary/Chest: Effort normal. No stridor. No respiratory distress. He has no wheezes. He has no rales.   Abdominal: Soft. Bowel sounds are normal. No mass appreciated. There is no tenderness. There is no rebound and no guarding. No hernia.   Musculoskeletal: No edema or tenderness appreciated.   Neurological: Alert and oriented to person, place, and time.   Skin: Skin is warm and dry.   Psychiatric: Normal mood and affect. Behavior is normal.   Vitals reviewed.    STROKE  DOCUMENTATION       NIH Scale:  1a. Level Of Consciousness: 0-->Alert: keenly responsive  1b. LOC Questions: 0-->Answers both questions correctly  1c. LOC Commands: 0-->Performs both tasks correctly  2. Best Gaze: 0-->Normal  3. Visual: 0-->No visual loss  4. Facial Palsy: 0-->Normal symmetrical movements  5a. Motor Arm, Left: 0-->No drift: limb holds 90 (or 45) degrees for full 10 secs  5b. Motor Arm, Right: 0-->No drift: limb holds 90 (or 45) degrees for full 10 secs  6a. Motor Leg, Left: 0-->No drift: leg holds 30 degree position for full 5 secs  6b. Motor Leg, Right: 0-->No drift: leg holds 30 degree position for full 5 secs  7. Limb Ataxia: 0-->Absent  8. Sensory: 0-->Normal: no sensory loss  9. Best Language: 0-->No aphasia: normal  10. Dysarthria: 0-->Normal  11. Extinction and Inattention (formerly Neglect): 0-->No abnormality  Total (NIH Stroke Scale): 0    Modified Donal Score: 0  Fabien Coma Scale:    ABCD2 Score: 2  [id number] Score:   HAS -BLED Score:   ICH Score:   Hunt & Chavez Classification:       Assessment/Plan:     Diagnostic Results:      Brain imaging:  MRI Brain w/o   Acute/recent small infarct involving the right posterior body of the caudate and posterior thalamus. Findings appear stable compared 1/30/18. No evidence of new acute infarct or intracranial hemorrhage.  Age-advanced chronic microvascular ischemic changes.  Bilateral mastoid effusions, right greater than left.  Unremarkable MRA of the head specifically without evidence for focal stenosis or intracranial aneurysm. Incidental left fetal origin PCA, an anatomic variant.  Unremarkable MRA of the neck without evidence for significant focal stenosis or occlusion.  CT head 2/1/118: no acute abnormality.  MRI brain 1/29/18: Small region of signal abnormality right posterior body of the caudate and posterior thalamus most compatible with acute/recent infarction     Vessel Imaging:  None for review    Cardiac Evaluation:   EKG: NSR  Echo    CONCLUSIONS     1 - Normal left ventricular systolic function (EF 55-60%).     2 - Right ventricular enlargement with normal systolic function.     3 - Normal left ventricular diastolic function.     4 - Biatrial enlargement.     5 - Highly mobile, redundant interatrial septal tissue with right to left shunt across the atrial septum.     6 - The estimated PA systolic pressure is 35 mmHg.     7 - Increased central venous pressure.     Interventions: None    Complications: None    Disposition:     Final Active Diagnoses:    Diagnosis Date Noted POA    PRINCIPAL PROBLEM:  Right sided weakness [R53.1] 02/01/2018 Yes    PFO (patent foramen ovale) [Q21.1] 02/02/2018 Not Applicable    TIA involving carotid artery [G45.1] 02/01/2018 Yes      Problems Resolved During this Admission:    Diagnosis Date Noted Date Resolved POA     PFO (patent foramen ovale)    Patient informed of PFO revealed on echo. He was instructed that he will need to see a cardiologist as soon as he is able and that he will need a repair of this PFO to protect himself from further neuroembolic events    CONCLUSIONS     1 - Normal left ventricular systolic function (EF 55-60%).     2 - Right ventricular enlargement with normal systolic function.     3 - Normal left ventricular diastolic function.     4 - Biatrial enlargement.     5 - Highly mobile, redundant interatrial septal tissue with right to left shunt across the atrial septum.     6 - The estimated PA systolic pressure is 35 mmHg.     7 - Increased central venous pressure.         TIA involving carotid artery    51 y/o with small acute infarction posterior body of the caudate and posterior thalamus a week ago, presents after sustaining a very short lived episode of dizziness with right sided paralysis. He was seen this morning in the vascular neurology for stroke follow up.  Patient with NIHSS 0 at this time.  Possible TIA left brain.  Recommended admission to the hospital in order to get  repeat MRI as his symptoms seem to be in a different territory. Likewise, obtain MRA neck and brain, TTE, lipid profile.  ASA, atorvastatin.      Antithrombotics for secondary stroke prevention: Antiplatelets: Aspirin: 325 mg daily    Statins for secondary stroke prevention and hyperlipidemia, if present:   Statins: Atorvastatin- 40 mg daily    Aggressive risk factor modification: Exercise, None     Rehab efforts: None: Reason: No deficits    Diagnostics ordered/pending: HgbA1C to assess blood glucose levels, Lipid Profile to assess cholesterol levels, MRA head to assess vasculature, MRA neck/arch to assess vasculature, MRI head without contrast to assess brain parenchyma, TTE to assess cardiac function/status , TSH to assess thyroid function    VTE prophylaxis: Heparin 5000 units SQ every 8 hours    BP parameters: TIA: SBP <220 until imaging confirmation of no infarct                 Recommendations:     Post-discharge complication risks: None    Stroke Education given to: patient and family    Follow-up in Stroke Clinic in Patient will not f/u at this facility as he was here on business from [place] and will be in [place] for the following 6 weeks. He was instructed on the importance of vascular neuro f/u, and more importantly cardiology f/u for repair of recently discovered PFO.     Discharge Plan:  Antithrombotics: Aspirin 81mg  No need for statin as this was embolic 2/2 PFO most likely and patient is in excellent health and has normal lipid profile    Follow Up:  Follow-up Information     Jim Conrad O.D..    Contact information:  [address]  [place] [address]                   Patient Instructions:     Diet Adult Regular     Activity as tolerated     Notify your health care provider if you experience any of the following:  temperature >100.4     Notify your health care provider if you experience any of the following:  increased confusion or weakness     Notify your health care provider if you  experience any of the following:  persistent dizziness, light-headedness, or visual disturbances     Notify your health care provider if you experience any of the following:  severe persistent headache         Medications:  Reconciled Home Medications:   Current Discharge Medication List      CONTINUE these medications which have NOT CHANGED    Details   ibuprofen (ADVIL,MOTRIN) 200 MG tablet Take 200 mg by mouth every 6 (six) hours as needed for Pain.      aspirin 81 MG Chew Take 81 mg by mouth once daily.      loratadine (CLARITIN) 10 mg tablet Take by mouth.           Tiago Lindsey MD  Comprehensive Stroke Center  Department of Vascular Neurology   [facility]

## 2018-02-02 NOTE — PLAN OF CARE
Problem: Patient Care Overview  Goal: Plan of Care Review  Outcome: Ongoing (interventions implemented as appropriate)  Patient AAOx4.Safety maintained. Bed locked in low with 2 side rails up. Call light within reach. No events noted on tele.

## 2018-02-02 NOTE — SUBJECTIVE & OBJECTIVE
Past Medical History:   Diagnosis Date    Migraine     Stroke     TIA involving carotid artery 2/1/2018     History reviewed. No pertinent surgical history.  Review of patient's allergies indicates:  No Known Allergies    Scheduled Medications:    aspirin  325 mg Oral Daily    atorvastatin  40 mg Oral Daily    cetirizine  5 mg Oral Daily    midazolam  2 mg Intravenous Once    sodium chloride 0.9%  3 mL Intravenous Q8H       PRN Medications: labetalol, sodium chloride 0.9%    Family History     Problem Relation (Age of Onset)    Hypertension Mother    Stroke Father        Social History Main Topics    Smoking status: Never Smoker    Smokeless tobacco: Never Used    Alcohol use Yes    Drug use: No    Sexual activity: Not on file     Review of Systems   Constitutional: Negative for chills, fatigue and fever.   HENT: Negative for drooling, facial swelling, trouble swallowing and voice change.    Eyes: Negative for photophobia and visual disturbance.   Respiratory: Negative for cough, shortness of breath and wheezing.    Cardiovascular: Negative for chest pain and palpitations.   Gastrointestinal: Negative for abdominal distention, nausea and vomiting.   Genitourinary: Negative for difficulty urinating and flank pain.   Musculoskeletal: Negative for arthralgias, gait problem and myalgias.   Skin: Negative for color change and rash.   Neurological: Negative for speech difficulty, weakness, numbness and headaches.   Psychiatric/Behavioral: Negative for agitation and confusion.     Objective:     Vital Signs (Most Recent):  Temp: 96.5 °F (35.8 °C) (02/02/18 0749)  Pulse: (!) 51 (02/02/18 0751)  Resp: 16 (02/02/18 0749)  BP: 117/74 (02/02/18 0749)  SpO2: 98 % (02/02/18 0749)    Vital Signs (24h Range):  Temp:  [96.5 °F (35.8 °C)-98.5 °F (36.9 °C)] 96.5 °F (35.8 °C)  Pulse:  [44-66] 51  Resp:  [12-18] 16  SpO2:  [96 %-100 %] 98 %  BP: (115-143)/(59-85) 117/74     Body mass index is 24.54 kg/m².    Physical Exam    Constitutional: He is oriented to person, place, and time. He appears well-developed and well-nourished.   HENT:   Head: Normocephalic and atraumatic.   Eyes: EOM are normal. Pupils are equal, round, and reactive to light.   Neck: Normal range of motion.   Cardiovascular: Normal rate and regular rhythm.    Pulmonary/Chest: Effort normal. No respiratory distress.   Abdominal: Soft. There is no tenderness.   Musculoskeletal: Normal range of motion. He exhibits no edema.   Neurological: He is alert and oriented to person, place, and time. No sensory deficit. He exhibits normal muscle tone.   RUE: 5/5.  LUE: 5/5.  RLE: 5/5.  LLE: 5/5.     Skin: Skin is warm and dry.   Psychiatric: He has a normal mood and affect. His behavior is normal.     NEUROLOGICAL EXAMINATION:     MENTAL STATUS   Oriented to person, place, and time.     CRANIAL NERVES     CN III, IV, VI   Pupils are equal, round, and reactive to light.  Extraocular motions are normal.       Diagnostic Results:   Labs: Reviewed  ECG: Reviewed  CT: Reviewed  MRI: Reviewed

## 2018-02-02 NOTE — PLAN OF CARE
02/02/18 1309   Final Note   Assessment Type Final Discharge Note   Discharge Disposition Home     Patient is discharged to [facility] today

## 2018-02-02 NOTE — PLAN OF CARE
PCP:Jim Conrad O.D.    Extended Emergency Contact Information  Primary Emergency Contact: Usha Pineda  Address: [address] [id number] [place]  Home Phone: [phone]  Mobile Phone: [phone]  Relation: Spouse    [id number] - [place], [place] - [address] AT [facility] [address]  [address]  [facility] [address]  Phone: [phone] Fax: [phone]    [id number] - [place], [place] - [address] AT [facility] [address]  [address]  [facility] [address]  Phone: [phone] Fax: [phone]    Payor: GENERIC COMMERCIAL / Plan: GENERIC COMMERCIAL / Product Type: Commercial /     Patient was independent living with his wife prior to hospitalization. PT/OT/SLP have discharged the patient. Patient has no discharge needs at this time. Cm will continue to follow.     02/02/18 1100   Discharge Assessment   Assessment Type Discharge Planning Assessment   Confirmed/corrected address and phone number on facesheet? Yes   Assessment information obtained from? Patient;Caregiver   Expected Length of Stay (days) 1   Communicated expected length of stay with patient/caregiver yes   Prior to hospitilization cognitive status: Alert/Oriented   Prior to hospitalization functional status: Independent   Current cognitive status: Alert/Oriented   Current Functional Status: Independent   Facility Arrived From: Hotel   Lives With spouse   Able to Return to Prior Arrangements yes   Is patient able to care for self after discharge? Yes   Who are your caregiver(s) and their phone number(s)? Usha Burch (wife) [phone]   Patient's perception of discharge disposition home or selfcare   Readmission Within The Last 30 Days no previous admission in last 30 days   Patient currently being followed by outpatient case management? No   Patient currently receives any other outside agency services? No    Equipment Currently Used at Home none   Do you have any problems affording any of your prescribed medications? (na)   Is the patient taking medications as prescribed? (na)   Does the patient have transportation home? Yes   Transportation Available car;family or friend will provide   Does the patient receive services at the Coumadin Clinic? No   Discharge Plan A Home with family   Discharge Plan B Home with family   Patient/Family In Agreement With Plan yes

## 2018-02-02 NOTE — PROGRESS NOTES
"  [facility]  Adult Nutrition  Consult Note    SUMMARY     Recommendations  Recommendation/Intervention:   1. Recommend Cardiac diet.   2. Educated pt on cardiac diet (stroke pathway).  3. RD to follow.     Goals: PO intake >/= 85% EEN and EPN   Nutrition Goal Status: new  Communication of RD Recs: other (care plan)    Reason for Assessment  Reason for Assessment: physician consult (assess dietary needs; stroke pathway education)  Diagnosis: stroke/CVA  Relevant Medical History: Migraines, seizures, TIA   Interdisciplinary Rounds: did not attend     General Information Comments: Pt and wife in room at the time of visit. Pt states that he has less of an appetitie after stroke. He believes he is still eating ~75% of meals. He reports no wt loss or N/V/D/C.     Nutrition Discharge Planning: adequate PO intake; cardiac diet     Nutrition Prescription Ordered  Current Diet Order: Regular     Evaluation of Received Nutrients/Fluid Intake  Comments: LBM 2/1  % Intake of Estimated Energy Needs: 75 - 100 %  % Meal Intake: 75%     Nutrition Risk Screen  Nutrition Risk Screen: no indicators present    Nutrition/Diet History  Patient Reported Diet/Restrictions/Preferences: other (see comments) (low fat, low carb)  Food Preferences: no religous/cultural prefs noted     Labs/Tests/Procedures/Meds  Pertinent Labs Reviewed: reviewed, pertinent  Pertinent Labs Comments: Alk Phos 34, Albumin 3.4, Ca 8.6  Pertinent Medications Reviewed: reviewed, pertinent  Pertinent Medications Comments: NaCl, statin, aspirin, cetirizine, midazolam    Physical Findings  Overall Physical Appearance: nourished  Oral/Mouth Cavity: WDL  Skin: intact    Anthropometrics  Height: 5' 11" (180.3 cm)  Weight Method: Standard Scale  Weight: 79.8 kg (175 lb 14.8 oz)  Ideal Body Weight (IBW), Male: 172 lb  % Ideal Body Weight, Male (lb): 102.28 lb  BMI (Calculated): 24.6  BMI Grade: 18.5-24.9 - normal     Estimated/Assessed Needs  Weight Used " For Calorie Calculations: 79.8 kg (175 lb 14.8 oz)   Energy Calorie Requirements (kcal): 2075 kcal/d  Energy Need Method: Bartow-St Jeor (1.25 (PAL) )  RMR (Bartow-St. Jeor Equation): 1660.12  Weight Used For Protein Calculations: 79.8 kg (175 lb 14.8 oz)  Protein Requirements: 80-96 gm/d (1.0-1.2 g/kg)  Fluid Requirements (mL): 1ml/kcal or per MD  Fluid Need Method: RDA Method  RDA Method (mL): 2075     Assessment and Plan  No nutrition diagnosis at this time       Monitor and Evaluation  Food and Nutrient Intake: energy intake, food and beverage intake  Food and Nutrient Adminstration: diet order  Knowledge/Beliefs/Attitudes: food and nutrition knowledge/skill  Physical Activity and Function: nutrition-related ADLs and IADLs  Anthropometric Measurements: weight, weight change  Biochemical Data, Medical Tests and Procedures: electrolyte and renal panel, gastrointestinal profile, glucose/endocrine profile, inflammatory profile, lipid profile  Nutrition-Focused Physical Findings: overall appearance    Nutrition Risk  Level of Risk: other (f/u 1x/1k)    Nutrition Follow-Up  RD Follow-up?: Yes

## 2018-02-02 NOTE — CONSULTS
[facility]  Physical Medicine & Rehab  Consult Note    Patient Name: Andrew Pineda  MRN: [id number]  Admission Date: 2/1/2018  Hospital Length of Stay: 0 days  Attending Physician: Lucius Petersen MD     Inpatient consult to Physical Medicine & Rehabilitation  Consult performed by: Heidi Montano NP  Consult requested by:  Lucius Petersen MD    Reason for Consult:  assess rehabilitation needs  Consults  Subjective:     Principal Problem: Transient Ischemic Attack     HPI: Andrew Pineda is a 52-year-old male with PMHx of migraines and acute infarction involving right posterior body of the caudate and posterior thalamus a week ago without residual deficits while on vacation in [place].  Patient presented to [facility] vascular neurology clinic on 2/1/18 with transient dizziness with R sided paralysis that lasted for few seconds this morning. He was admitted to the hospital from clinic.  CTH revealed small right right posterior caudate infarct with no new intracranial infarct, hemorrhage or mass effect.  MRI/MRA brain revealed stable small right posterior body of the caudate and posterior thalamus without focal stenosis or aneurysm.  Echo pending.  VN following.      Functional History: Patient lives in [place], [place] with wife in a two story home with bedroom on 2nd floor with 8 steps to enter.  Prior to admission, (I) with ADLs and mobility.  DME: none.      Hospital Course: 2/1/18: Evaluated by OT.  PT evaluation pending.  Bed mobility, sit to stand, and transfers (I).  UBD and LBD (I).      Past Medical History:   Diagnosis Date    Migraine     Stroke     TIA involving carotid artery 2/1/2018     History reviewed. No pertinent surgical history.  Review of patient's allergies indicates:  No Known Allergies    Scheduled Medications:    aspirin  325 mg Oral Daily    atorvastatin  40 mg Oral Daily    cetirizine  5 mg Oral Daily    midazolam  2 mg Intravenous Once    sodium chloride 0.9%  3 mL  Intravenous Q8H       PRN Medications: labetalol, sodium chloride 0.9%    Family History     Problem Relation (Age of Onset)    Hypertension Mother    Stroke Father        Social History Main Topics    Smoking status: Never Smoker    Smokeless tobacco: Never Used    Alcohol use Yes    Drug use: No    Sexual activity: Not on file     Review of Systems   Constitutional: Negative for chills, fatigue and fever.   HENT: Negative for drooling, facial swelling, trouble swallowing and voice change.    Eyes: Negative for photophobia and visual disturbance.   Respiratory: Negative for cough, shortness of breath and wheezing.    Cardiovascular: Negative for chest pain and palpitations.   Gastrointestinal: Negative for abdominal distention, nausea and vomiting.   Genitourinary: Negative for difficulty urinating and flank pain.   Musculoskeletal: Negative for arthralgias, gait problem and myalgias.   Skin: Negative for color change and rash.   Neurological: Negative for speech difficulty, weakness, numbness and headaches.   Psychiatric/Behavioral: Negative for agitation and confusion.     Objective:     Vital Signs (Most Recent):  Temp: 96.5 °F (35.8 °C) (02/02/18 0749)  Pulse: (!) 51 (02/02/18 0751)  Resp: 16 (02/02/18 0749)  BP: 117/74 (02/02/18 0749)  SpO2: 98 % (02/02/18 0749)    Vital Signs (24h Range):  Temp:  [96.5 °F (35.8 °C)-98.5 °F (36.9 °C)] 96.5 °F (35.8 °C)  Pulse:  [44-66] 51  Resp:  [12-18] 16  SpO2:  [96 %-100 %] 98 %  BP: (115-143)/(59-85) 117/74     Body mass index is 24.54 kg/m².    Physical Exam   Constitutional: He is oriented to person, place, and time. He appears well-developed and well-nourished.   HENT:   Head: Normocephalic and atraumatic.   Eyes: EOM are normal. Pupils are equal, round, and reactive to light.   Neck: Normal range of motion.   Cardiovascular: Normal rate and regular rhythm.    Pulmonary/Chest: Effort normal. No respiratory distress.   Abdominal: Soft. There is no tenderness.    Musculoskeletal: Normal range of motion. He exhibits no edema.   Neurological: He is alert and oriented to person, place, and time. No sensory deficit. He exhibits normal muscle tone.   RUE: 5/5.  LUE: 5/5.  RLE: 5/5.  LLE: 5/5.  Skin: Skin is warm and dry.   Psychiatric: He has a normal mood and affect. His behavior is normal.       Diagnostic Results:   Labs: Reviewed  ECG: Reviewed  CT: Reviewed  MRI: Reviewed    Assessment/Plan:     TIA involving carotid artery    Functional status: see hospital course  Cognitive/Speech/Language status:  Speech, language, and cognitive skills WFL.  Nutrition/Swallow Status:  Passed bedside swallow evaluation for regular and thin liquids.     Recommendations  -  Encourage mobility, OOB in chair at least 3 hours per day, and early ambulation as appropriate   -  PT/OT evaluate and treat  -  SLP speech and cognitive evaluate and treat  -  Monitor sleep disturbances and establish consistent sleep-wake cycle  -  Monitor for bowel and bladder dysfunction  -  Monitor for shoulder pain and subluxation  -  Monitor for spasticity  -  Monitor for and prevent skin breakdown and pressure ulcers  · Early mobility, repositioning/weight shifting every 20-30 minutes when sitting, turn patient every 2 hours, proper mattress/overlay and chair cushioning, pressure relief/heel protector boots  -  DVT prophylaxis:  [facility]  -  Reviewed discharge options (IP rehab, SNF, HH therapy, and OP therapy)          Right sided weakness    -h/o of recent stroke while on vacation in [place]   -admitted to hospital from VN clinic with transient dizziness and R sided weakness that has improved   -see TIA        Close to premorbid level of function with OT and SLP.  PT evaluation pending.  Patient with limited goals for inpatient rehab.  Agree with therapy recommendations for home.  Will sign off.  Please call with questions/concerns or re-consult if situation changes.      Thank you for your consult.     Heidi Montano,  NP  Department of Physical Medicine & Rehab  [facility]

## 2018-02-02 NOTE — PT/OT/SLP EVAL
"Speech Language Pathology Evaluation  Cognitive/Bedside Swallow    Patient Name:  Andrew Pineda   MRN:  [id number]  Admitting Diagnosis: <principal problem not specified>    Recommendations:                  General Recommendations:  home  Diet recommendations:  Regular, Thin   Aspiration Precautions: Standard aspiration precautions   General Precautions: Standard, aspiration  Communication strategies:  none    History:     Past Medical History:   Diagnosis Date    Migraine     Stroke     TIA involving carotid artery 2/1/2018       History reviewed. No pertinent surgical history.    Social History: Patient lives with wife.    Prior diet: regular    Occupation/hobbies/homemaking: business    Subjective     "i am fine now"  Patient goals: get better    Pain/Comfort:  · Pain Rating 1: 0/10  · Pain Rating Post-Intervention 1: 0/10    Objective:     Cognitive Status:    Pt. was oriented x3 with good recall of recent and remote temporal and general information.  Immediate/delayed verbal recall was wfl with pt. Repeating 7 digits and 5 words without difficulty.  Pt. Recalled 3/3 objects after a 5 minute delay  Responses to hypothetical verbal problem solving tasks were accurate and complete.  Pt. Compared and contrasted objects and generated multiple solutions to problems.  Thought organization and categorization skills were wfl with pt. Naming 15 animals given one minute when 15-20 are wnl.  Pt. Responded to functional math and time calculations with 100% accuracy and sequenced 4 words presented auditorily on 2/2 trials.        Receptive Language:   Comprehension:   Pt. Responded to complex yes/no questions and multi step commands with 100% accuracy and no delays in responding.        Pragmatics:    wnl    Expressive Language:  Verbal:    Verbal language skills were wfl with no evidence of aphasia.  Pt. Expressed their thoughts coherently in conversation with no evidence of confusion or word finding deficits     "    Motor Speech:  wnl    Voice:   wnl    Visual-Spatial:  wnl    Reading:   wnl     Written Expression:   wnl    Oral Musculature Evaluation  · Oral Musculature: WNL  · Dentition: present and adequate  · Mucosal Quality: good  · Mandibular Strength and Mobility: WNL  · Oral Labial Strength and Mobility: WNL  · Lingual Strength and Mobility: WNL  · Velar Elevation: WNL  · Buccal Strength and Mobility: WNL  · Volitional Cough: strong  · Volitional Swallow: no delay  · Voice Prior to PO Intake: wnl    Bedside Swallow Eval:   Consistencies Assessed:  · Thin liquids 1 cup juice  · Solids peanut butter on toast     Oral Phase:   · WNL    Pharyngeal Phase:   · no overt clinical signs/symptoms of aspiration  · no overt clinical signs/symptoms of pharyngeal dysphagia    Compensatory Strategies  · None        Assessment:     Andrew Pineda is a 52 y.o. male with speech language and cognitive skills wnl.  Oral and pharyngeal phases of swallow were wnl  Goals:    SLP Goals        Problem: SLP Goal    Goal Priority Disciplines Outcome   SLP Goal     SLP Ongoing (interventions implemented as appropriate)                   Plan:     · Patient to be seen:      · Plan of Care expires:     · Plan of Care reviewed with:  patient   · SLP Follow-Up:  No       Discharge recommendations:  Discharge Facility/Level Of Care Needs: home   Barriers to Discharge:  none    Time Tracking:     SLP Treatment Date:   02/02/18  Speech Start Time:  0810  Speech Stop Time:  0829     Speech Total Time (min):  19 min    Billable Minutes: Eval 11  and Eval Swallow and Oral Function 8    Carolyn Barraza MA, CCC-SLP  02/02/2018

## 2018-02-02 NOTE — PLAN OF CARE
Problem: SLP Goal  Goal: SLP Goal  Outcome: Ongoing (interventions implemented as appropriate)  Regular diet with thin liquids recommended.    Carolyn Barraza MA/ZULEMA-SLP  Speech Language Pathologist  Pager [phone]  2/2/2018

## 2018-02-02 NOTE — HPI
Andrew Pineda is a 52-year-old male with PMHx of migraines and acute infarction involving right posterior body of the caudate and posterior thalamus a week ago without residual deficits.  Patient presented to [facility] on 2/1/18 with transient dizziness with R sided paralysis that lasted for few seconds this morning.  CTH revealed small right right posterior caudate infarct with no new intracranial infarct, hemorrhage or mass effect.  MRI/MRA brain revealed stable small right posterior body of the caudate and posterior thalamus without focal stenosis or aneurysm.  Echo pending.  VN following.      Functional History: Patient lives in [place], [place] with wife in a two story home with bedroom on 2nd floor with 8 steps to enter.  Prior to admission, (I) with ADLs and mobility.  DME: none.

## 2018-02-02 NOTE — PT/OT/SLP EVAL
"Occupational Therapy   Evaluation/Discharge Summary    Name: Andrew Pineda  MRN: [id number]  Admitting Diagnosis: Stroke    Recommendations:     Discharge Recommendations: home  Discharge Equipment Recommendations:  none  Barriers to discharge:  None    History:     Occupational Profile:  Patient resides in [place], [place] with wife in a 2 story home with bedroom on the 2nd floor; 8 steps to enter.  PTA patient independent with ADLs including driving.  Currently does not own any DME.  Patient is right handed.  Works: insurance industry.  Hobbies:  Walking, ice hockey, reading, golf, boating.  Roles:      Past Medical History:   Diagnosis Date    Migraine     Stroke     TIA involving carotid artery 2/1/2018       History reviewed. No pertinent surgical history.    Subjective     Patient:  "Monday morning, I couldn't get up from a stool.  My left arm and leg were a problem.  I lost full motor skills.  My left leg felt like there was a magnet under it; I had to drag my leg to get to bed.  My thought was it was a migraine.  The whole left side was black.  I didn't have speech issues and my cognition was good the whole time.  Tuesday morning, I had an MRI and it revealed a small stroke.  Tuesday and Wednesday were fine.  I had full motor control.  I felt 100%.  I did have a nagging headache through Wednesday.  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  Thursday I also felt great.  I had an apt with Dr. Dawn who scheduled an echo and CT.  Then I went for breakfast and my right side just dropped.  They called 911 and I am here.  I was better in about 30 seconds.  They are suppose to do an echo this morning.  Last night I walked to the [facility].  My vision is normal."   Communicated with: Nurse prior to session.  Pain/Comfort:  · Pain Rating 1: 0/10  · Pain Rating Post-Intervention 1: 0/10    Patients cultural, spiritual, Orthodoxy conflicts given the current situation: Lutheran    Objective: "     Patient found with: telemetry, peripheral IV  Family not present.  General Precautions: Standard, aspiration, fall   Orthopedic Precautions:N/A   Braces: N/A     Occupational Performance:    Bed Mobility:    · Patient completed Rolling/Turning to Left with  independence  · Patient completed Rolling/Turning to Right with independence  · Patient completed Scooting/Bridging with independence  · Patient completed Supine to Sit with independence  · Patient completed Sit to Supine with independence    Functional Mobility/Transfers:  · Patient completed Sit <> Stand Transfer with independence  with  no assistive device   · Patient completed Bed <> Chair Transfer using Stand Pivot technique with independence with no assistive device  · Patient completed Toilet Transfer Stand Pivot technique with independence with  no AD    Activities of Daily Living:  · Feeding:  independence    · Grooming: independence    · UB Dressing: independence    · LB Dressing: independence    · Toileting: independence    No difficulty tying laces     Cognitive/Visual Perceptual:  Cognitive/Psychosocial Skills:  -       Oriented to: Person, Place, Time and Situation   -       Follows Commands/attention:Follows multistep  commands  -       Communication: clear/fluent  -       Memory: No Deficits noted  -       Safety awareness/insight to disability: intact   -       Mood/Affect/Coping skills/emotional control: Appropriate to situation  Visual/Perceptual:      -Intact    Physical Exam:  Postural examination/scapula alignment:    -       Rounded shoulders  Skin integrity: Visible skin intact  Edema:  None noted  Sensation:    -       Intact  Upper Extremity Range of Motion:     -       Right Upper Extremity: WNL  -       Left Upper Extremity: WNL  Upper Extremity Strength:    -       Right Upper Extremity: WNL  -       Left Upper Extremity: WNL  Fine Motor Coordination: dysmetria, left hand  Patient left supine with all lines intact and call button  "in reach    [facility] 6 Click:  [facility] Total Score: 24    Treatment & Education:  Patient education provided for stroke warning signs, prevention guidelines and personal risk factors.  Patient verbalizing understanding via teach back method.  Patient education provided on role of OT and need for no further OT upon discharge.   Patient verbalizing understanding via teach back method. HEP issued for core stabilization; demonstrating independence.  Patient's functional status and disposition recommendation discussed with stroke team in daily rounds.  White board updated in patient's room.  OT asked if there were any other questions; patient/ family had no further questions.       Education:    Assessment:     Andrew Pineda is a 52 y.o. male with a medical diagnosis of stroke.  He presents with ability to organize occupational performance in a timely and safe manner; demonstrating skills necessary to successfully and appropriately participate in everyday tasks and social situations.  No activity limitations noted. No further OT needed.     Rehab Prognosis:  Good; patient would benefit from acute skilled OT services to address these deficits and reach maximum level of function.         Clinical Decision Makin.  OT Low:  "Pt evaluation falls under low complexity for evaluation coding due to performance deficits noted in 1-3 areas as stated above and 0 co-morbities affecting current functional status. Data obtained from problem focused assessments. No modifications or assistance was required for completion of evaluation. Only brief occupational profile and history review completed."     Plan:     · Discharge from OT services on acute  · Plan of Care Expires: 18  · Plan of Care Reviewed with: patient    This Plan of care has been discussed with the patient who was involved in its development and understands and is in agreement with the identified goals and treatment plan    GOALS:    Occupational Therapy Goals     " Not on file          Multidisciplinary Problems (Resolved)        Problem: Occupational Therapy Goal    Goal Priority Disciplines Outcome Interventions   Occupational Therapy Goal   (Resolved)     OT, PT/OT Outcome(s) achieved    Description:  Goals not set 2* no further OT needed.                    Time Tracking:     OT Date of Treatment: 02/02/18  OT Start Time: 0655  OT Stop Time: 0721  OT Total Time (min): 26 min    Billable Minutes:Evaluation 15  Neuromuscular Re-education 11    EMI Stevenson  2/2/2018

## 2018-02-02 NOTE — PLAN OF CARE
Problem: Patient Care Overview  Goal: Plan of Care Review  Outcome: Ongoing (interventions implemented as appropriate)  Pt progressing towards goals.remains on telemetry,SB.remains neuro intact.continue to monitor neuro status q4hrs.scheduled fro 2d echo this am.safety precautions maintained.pt free of falls or injuries.continue plan of care.

## 2018-02-02 NOTE — PLAN OF CARE
Problem: Patient Care Overview  Goal: Plan of Care Review  Outcome: Ongoing (interventions implemented as appropriate)  Recommendations  Recommendation/Intervention:   1. Recommend Cardiac diet.   2. Educated pt on cardiac diet (stroke pathway).  3. RD to follow.      Goals: PO intake >/= 85% EEN and EPN   Nutrition Goal Status: new

## 2018-02-02 NOTE — PLAN OF CARE
Problem: Occupational Therapy Goal  Goal: Occupational Therapy Goal  Goals not set 2* no further OT needed.  Outcome: Outcome(s) achieved Date Met: 02/02/18  OT evaluation completed; HEP issued.  EMI Stevenson  2/2/2018

## 2018-02-02 NOTE — ASSESSMENT & PLAN NOTE
Functional status: see hospital course  Cognitive/Speech/Language status:  Speech, language, and cognitive skills WFL.  Nutrition/Swallow Status:  Passed bedside swallow evaluation for regular and thin liquids.     Recommendations  -  Encourage mobility, OOB in chair at least 3 hours per day, and early ambulation as appropriate   -  PT/OT evaluate and treat  -  SLP speech and cognitive evaluate and treat  -  Monitor sleep disturbances and establish consistent sleep-wake cycle  -  Monitor for bowel and bladder dysfunction  -  Monitor for shoulder pain and subluxation  -  Monitor for spasticity  -  Monitor for and prevent skin breakdown and pressure ulcers  · Early mobility, repositioning/weight shifting every 20-30 minutes when sitting, turn patient every 2 hours, proper mattress/overlay and chair cushioning, pressure relief/heel protector boots  -  DVT prophylaxis:  [facility]  -  Reviewed discharge options (IP rehab, SNF, HH therapy, and OP therapy)

## 2018-02-02 NOTE — HOSPITAL COURSE
2/1/18: Evaluated by OT.  PT evaluation pending.  Bed mobility, sit to stand, and transfers (I).  UBD and LBD (I).

## 2018-02-02 NOTE — HOSPITAL COURSE
Andrew Pineda presented after an episode of dizziness and right sided weakness on 02/01. He was stable on admission without any neuro deficits or signs of CVA otherwise, imaging was negative, he remained asymptomatic and was stable and medically appropriate for DC to home on 02/02. He was instructed to continue to take home ASA 81mg daily. He was found to have a patent PFO this admission and is high risk for embolic events in the future. Patient was sent home with 30 day event monitor and instructed to f/u with cardiologist as soon as he is able.

## 2018-02-02 NOTE — PLAN OF CARE
Problem: Physical Therapy Goal  Goal: Physical Therapy Goal  Outcome: Outcome(s) achieved Date Met: 02/02/18  Initial eval completed.  Results, POC, and therapy recommendations discussed with patient and wife.  Complete evaluation documentation to follow.     Pt is independent with mobility. No PT needs at this time. Safe to d/c home when medically appropriate.     Adore Gilmore, PT  2/2/2018  [phone] (pager)

## 2018-02-02 NOTE — PROGRESS NOTES
Discharge instructions provided to and gone over with patient and spouse. Understanding verbalized. IV removed, tip intact. Tele monitor removed. Patient ambulated off unit with spouse.

## 2018-02-02 NOTE — PROGRESS NOTES
Pt. Identified via spelling of name and date of birth. Saline lock flushed pre and post use under aseptic technique. Bubble study x 2 performed with and without valsalva maneuver. Tolerated procedure well.

## 2018-02-02 NOTE — ASSESSMENT & PLAN NOTE
Patient informed of PFO revealed on echo. He was instructed that he will need to see a cardiologist as soon as he is able and that he will need a repair of this PFO to protect himself from further neuroembolic events    CONCLUSIONS     1 - Normal left ventricular systolic function (EF 55-60%).     2 - Right ventricular enlargement with normal systolic function.     3 - Normal left ventricular diastolic function.     4 - Biatrial enlargement.     5 - Highly mobile, redundant interatrial septal tissue with right to left shunt across the atrial septum.     6 - The estimated PA systolic pressure is 35 mmHg.     7 - Increased central venous pressure.

## 2018-02-05 ENCOUNTER — TELEPHONE (OUTPATIENT)
Dept: NEUROSURGERY | Facility: HOSPITAL | Age: 53
End: 2018-02-05

## 2018-02-05 NOTE — TELEPHONE ENCOUNTER
Patient returning call.  Spoke with patient.  Risk factors specific to patient for stroke discussed with teach back implemented.  Patient verbalized understanding of discharge instructions and medications.  Patient was asked about discharge appointments and follow up care.  Follow appointment scheduled when patient arrives back in [place]. Warning signs discussed with teach back discussion method implemented.  Notified to seek immediate medical help via 911 if new or worsening stroke symptoms occur.  Patient relayed no new questions or comments at this time.  Instructed to call [facility] with any further questions.

## 2018-02-10 ENCOUNTER — PATIENT MESSAGE (OUTPATIENT)
Dept: NEUROLOGY | Facility: CLINIC | Age: 53
End: 2018-02-10

## 2018-10-22 ENCOUNTER — PATIENT MESSAGE (OUTPATIENT)
Dept: NEUROLOGY | Facility: CLINIC | Age: 53
End: 2018-10-22

## 2020-12-30 NOTE — TELEPHONE ENCOUNTER
"Attempted to contact patient via number on file.  No answer.  The following message was left for patient to return call "Hello.  This is a message for Liam Keepers.  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  If you could give me call back at [phone] between the hours of 08:00 AM and 04:00 PM, Monday through Friday.  Thanks so much and have a great day."  Will try again later.   "
denies